# Patient Record
Sex: MALE | Race: WHITE | NOT HISPANIC OR LATINO | Employment: OTHER | URBAN - METROPOLITAN AREA
[De-identification: names, ages, dates, MRNs, and addresses within clinical notes are randomized per-mention and may not be internally consistent; named-entity substitution may affect disease eponyms.]

---

## 2017-02-01 ENCOUNTER — TRANSCRIBE ORDERS (OUTPATIENT)
Dept: PULMONOLOGY | Facility: HOSPITAL | Age: 72
End: 2017-02-01

## 2017-02-01 ENCOUNTER — HOSPITAL ENCOUNTER (OUTPATIENT)
Dept: PULMONOLOGY | Facility: HOSPITAL | Age: 72
Discharge: HOME | End: 2017-02-01
Attending: INTERNAL MEDICINE

## 2017-02-01 DIAGNOSIS — R06.02 SHORTNESS OF BREATH: Primary | ICD-10-CM

## 2017-02-03 ENCOUNTER — HOSPITAL ENCOUNTER (OUTPATIENT)
Dept: PULMONOLOGY | Facility: HOSPITAL | Age: 72
Discharge: HOME | End: 2017-02-03
Attending: INTERNAL MEDICINE | Admitting: INTERNAL MEDICINE

## 2017-02-03 VITALS — OXYGEN SATURATION: 98 % | RESPIRATION RATE: 16 BRPM | HEART RATE: 97 BPM

## 2017-02-03 PROCEDURE — 63710000001 ALBUTEROL PER 1 MG: Performed by: INTERNAL MEDICINE

## 2017-02-03 PROCEDURE — 94729 DIFFUSING CAPACITY: CPT

## 2017-02-03 PROCEDURE — A9270 NON-COVERED ITEM OR SERVICE: HCPCS | Performed by: INTERNAL MEDICINE

## 2017-02-03 PROCEDURE — 94726 PLETHYSMOGRAPHY LUNG VOLUMES: CPT

## 2017-02-03 PROCEDURE — 94060 EVALUATION OF WHEEZING: CPT

## 2017-02-03 RX ORDER — ALBUTEROL SULFATE 2.5 MG/3ML
2.5 SOLUTION RESPIRATORY (INHALATION) ONCE
Status: COMPLETED | OUTPATIENT
Start: 2017-02-03 | End: 2017-02-03

## 2017-02-03 RX ADMIN — ALBUTEROL SULFATE 2.5 MG: 2.5 SOLUTION RESPIRATORY (INHALATION) at 10:36

## 2017-02-20 ENCOUNTER — TELEPHONE (OUTPATIENT)
Dept: INTERNAL MEDICINE | Facility: CLINIC | Age: 72
End: 2017-02-20

## 2017-02-20 RX ORDER — HYDROCORTISONE ACETATE 25 MG/1
25 SUPPOSITORY RECTAL 2 TIMES DAILY
Qty: 14 SUPPOSITORY | Refills: 0 | Status: SHIPPED | OUTPATIENT
Start: 2017-02-20 | End: 2017-02-27

## 2017-02-20 NOTE — TELEPHONE ENCOUNTER
Patient advised as per below and voiced understanding.    ---- Message from Gladis Longoria MD sent at 2/20/2017 12:11 PM EST -----  Regarding: RE: REQUESTING TO BE SEEN.  I escribed Anusol HC suppositories to his pharmacy.  He also needs to get on Colace stool softeners.  If not improving in a couple of days, needs to be seen.  Thanks.  ----- Message -----     From: Ivanna Hagen MA     Sent: 2/20/2017  10:38 AM       To: Gladis Longoria MD  Subject: FW: REQUESTING TO BE SEEN.                           ----- Message -----     From: Silke Zavala     Sent: 2/20/2017   9:54 AM       To: Yas Patel[address] [place] [address]  Subject: REQUESTING TO BE SEEN.                           BALBIR    Patient having severe pain with hemmhroids  Off and on x 3-4 weeks and last few days has become excruciating, especially when having a bowel movement.    Can Dr. Longoria work him in today or send something to pharmacy     [phone]    [facility]

## 2017-02-22 ENCOUNTER — OFFICE VISIT (OUTPATIENT)
Dept: INTERNAL MEDICINE | Facility: CLINIC | Age: 72
End: 2017-02-22

## 2017-02-22 VITALS
BODY MASS INDEX: 48.28 KG/M2 | SYSTOLIC BLOOD PRESSURE: 128 MMHG | HEART RATE: 122 BPM | HEIGHT: 65 IN | WEIGHT: 289.8 LBS | OXYGEN SATURATION: 96 % | TEMPERATURE: 98.1 F | DIASTOLIC BLOOD PRESSURE: 72 MMHG

## 2017-02-22 DIAGNOSIS — K62.5 RECTAL BLEEDING: ICD-10-CM

## 2017-02-22 DIAGNOSIS — F32.A DEPRESSION, UNSPECIFIED DEPRESSION TYPE: ICD-10-CM

## 2017-02-22 DIAGNOSIS — K62.89 RECTAL PAIN: Primary | ICD-10-CM

## 2017-02-22 PROCEDURE — 99213 OFFICE O/P EST LOW 20 MIN: CPT | Performed by: FAMILY MEDICINE

## 2017-02-22 RX ORDER — BUPROPION HYDROCHLORIDE 300 MG/1
300 TABLET ORAL DAILY
Qty: 30 TABLET | Refills: 5 | Status: SHIPPED | OUTPATIENT
Start: 2017-02-22 | End: 2017-05-17 | Stop reason: SDUPTHER

## 2017-02-22 NOTE — PROGRESS NOTES
Subjective     Kimberly Wei is a 71 y.o. male, who presents with a chief complaint of   Chief Complaint   Patient presents with   • Follow-up   • Hemorrhoids       HPI     1. Pt c/o rectal pain X 3 weeks.  He describes it a severe burning.  Associated with rectal bleeding/bright red blood in the toilet. 10 days ago it worsened and bowel movements have been extremely painful.  He has been taking stool softeners for 3-4 days.  He has tried preparation H, which he tried to apply internally and it was very painful to apply.    2. Depression.  He has been taking bupropion  mg for about 2 months.  He hasn't noticed much improvement and would like to increase the dose.    The following portions of the patient's history were reviewed and updated as appropriate: allergies, current medications, past family history, past medical history, past social history, past surgical history and problem list.    Allergies: Review of patient's allergies indicates no known allergies.    Review of Systems    Objective     Wt Readings from Last 3 Encounters:   02/22/17 289 lb 12.8 oz (131 kg)   10/04/16 287 lb (130 kg)   09/26/16 287 lb (130 kg)     Temp Readings from Last 3 Encounters:   02/22/17 98.1 °F (36.7 °C)     BP Readings from Last 3 Encounters:   02/22/17 128/72   10/04/16 155/88   09/26/16 130/90     Pulse Readings from Last 3 Encounters:   02/22/17 (!) 122   02/03/17 97   10/04/16 115     Body mass index is 48.23 kg/(m^2).  SpO2 Readings from Last 3 Encounters:   02/22/17 96%   02/03/17 98%   10/04/16 94%       Physical Exam   Constitutional: He appears well-developed and well-nourished. He appears distressed (mildly distressed).   HENT:   Head: Normocephalic.   Eyes: Conjunctivae are normal.   Genitourinary: Rectal exam shows external hemorrhoid (not thrombosed). Internal hemorrhoid: not done.   Neurological: He is alert.   Skin: Skin is warm and dry.   Psychiatric: He has a normal mood and affect. His behavior is normal.    Nursing note and vitals reviewed.        Assessment/Plan   Kimberly was seen today for follow-up and hemorrhoids.    Diagnoses and all orders for this visit:    Rectal pain  -     Ambulatory Referral to General Surgery    Depression, unspecified depression type  -     buPROPion XL (WELLBUTRIN XL) 300 MG 24 hr tablet; Take 1 tablet by mouth Daily.    Rectal bleeding  -     Ambulatory Referral to General Surgery    1. Rectal pain and bleeding.  ?Anal fissure.  Continue stool softeners.  Referral to general surgery.    2. Depression.  Minimal improvement.  Increased bupropion XL to 300 mg daily.      Outpatient Medications Prior to Visit   Medication Sig Dispense Refill   • hydrocortisone (ANUSOL-HC) 25 MG suppository Insert 1 suppository into the rectum 2 (Two) Times a Day for 7 days. 14 suppository 0   • lisinopril (PRINIVIL,ZESTRIL) 20 MG tablet Take 1 tablet by mouth daily. 90 tablet 3   • buPROPion XL (WELLBUTRIN XL) 150 MG 24 hr tablet Take 1 tablet by mouth daily. 30 tablet 5     No facility-administered medications prior to visit.      New Medications Ordered This Visit   Medications   • buPROPion XL (WELLBUTRIN XL) 300 MG 24 hr tablet     Sig: Take 1 tablet by mouth Daily.     Dispense:  30 tablet     Refill:  5     [unfilled]  Medications Discontinued During This Encounter   Medication Reason   • buPROPion XL (WELLBUTRIN XL) 150 MG 24 hr tablet Reorder         Return in about 2 months (around 4/22/2017).

## 2017-02-23 ENCOUNTER — OFFICE VISIT (OUTPATIENT)
Dept: SURGERY | Facility: CLINIC | Age: 72
End: 2017-02-23

## 2017-02-23 VITALS
HEIGHT: 65 IN | WEIGHT: 292.8 LBS | DIASTOLIC BLOOD PRESSURE: 72 MMHG | SYSTOLIC BLOOD PRESSURE: 128 MMHG | BODY MASS INDEX: 48.78 KG/M2

## 2017-02-23 DIAGNOSIS — K64.4 EXTERNAL HEMORRHOIDS: ICD-10-CM

## 2017-02-23 DIAGNOSIS — K64.8 INTERNAL HEMORRHOIDS: ICD-10-CM

## 2017-02-23 DIAGNOSIS — K62.89 PROCTALGIA: ICD-10-CM

## 2017-02-23 DIAGNOSIS — K62.5 RECTAL BLEED: Primary | ICD-10-CM

## 2017-02-23 PROCEDURE — 99203 OFFICE O/P NEW LOW 30 MIN: CPT | Performed by: SURGERY

## 2017-02-23 NOTE — PROGRESS NOTES
PATIENT INFORMATION  Kimberly Wei   - 1945    CHIEF COMPLAINT  Chief Complaint   Patient presents with   • Rectal Bleeding   • Rectal Pain    Referral from Dr. Langford    HISTORY OF PRESENT ILLNESS  HPI  Patient is a 71-year-old male referred by Dr. Langford for a 3 week history of rectal pain.  Patient reports he has had rectal pain and itching for the last 3 weeks. He describes it a severe burning. Associated with rectal bleeding/bright red blood in the toilet. 10 days ago it worsened and bowel movements have been extremely painful. He has been taking stool softeners for 3-4 days. He has tried preparation H, which he tried to apply internally and it was very painful to apply.  He was seen by his primary care physician yesterday.  He was prescribed Anusol HC suppositories.  With the patient's wife reports she was unable to insert as he was in severe pain.  He does report constipation lately.  Denies any diarrhea.  He has never had a colonoscopy before.  He did the COLOGARD Test 18 months ago and according to the patient it was negative.       REVIEW OF SYSTEMS  Review of Systems   Constitutional: Positive for activity change.   Respiratory: Negative.    Cardiovascular: Negative.    Gastrointestinal: Positive for blood in stool, constipation and rectal pain.   Genitourinary: Negative.    Musculoskeletal: Negative.    Skin: Negative.    Neurological: Negative.    Hematological: Negative.    Psychiatric/Behavioral:        Depression         ACTIVE PROBLEMS  Patient Active Problem List    Diagnosis   • Rectal pain [K62.89]   • Depression [F32.9]   • Fatigue [R53.83]   • Hypertension [I10]   • Routine health maintenance [Z00.00]         PAST MEDICAL HISTORY  Past Medical History   Diagnosis Date   • Hypertension          SURGICAL HISTORY  Past Surgical History   Procedure Laterality Date   • Cholecystectomy  1990s         FAMILY HISTORY  Family History   Problem Relation Age of Onset   • Diabetes  "Father    • Stomach cancer Father    • Alcohol abuse Father    • Throat cancer Brother    • Alcohol abuse Brother          SOCIAL HISTORY  Social History     Occupational History   • Not on file.     Social History Main Topics   • Smoking status: Former Smoker   • Smokeless tobacco: Never Used   • Alcohol use No   • Drug use: No   • Sexual activity: Defer         CURRENT MEDICATIONS    Current Outpatient Prescriptions:   •  buPROPion XL (WELLBUTRIN XL) 300 MG 24 hr tablet, Take 1 tablet by mouth Daily., Disp: 30 tablet, Rfl: 5  •  hydrocortisone (ANUSOL-HC) 25 MG suppository, Insert 1 suppository into the rectum 2 (Two) Times a Day for 7 days., Disp: 14 suppository, Rfl: 0  •  hydrocortisone (PROCTO-HEMANT) 1 % cream rectal cream, Insert  into the rectum 3 (Three) Times a Day., Disp: 30 g, Rfl: 0  •  lisinopril (PRINIVIL,ZESTRIL) 20 MG tablet, Take 1 tablet by mouth daily., Disp: 90 tablet, Rfl: 3  •  VENTOLIN  (90 BASE) MCG/ACT inhaler, As Needed., Disp: , Rfl:     ALLERGIES  Review of patient's allergies indicates no known allergies.    VITALS  Vitals:    02/23/17 1454   BP: 128/72   Weight: 292 lb 12.8 oz (133 kg)   Height: 65\" (165.1 cm)       LAST RESULTS   Hospital Outpatient Visit on 10/04/2016   Component Date Value Ref Range Status   • BH CV STRESS PROTOCOL 1 10/04/2016 Pankaj   Final   • Stage 1 10/04/2016 1   Final   • Duration Min Stage 1 10/04/2016 3   Final   • Duration Sec Stage 1 10/04/2016 0   Final   • Grade Stage 1 10/04/2016 10   Final   • Speed Stage 1 10/04/2016 1.7   Final   • BH CV STRESS METS STAGE 1 10/04/2016 5   Final   • Baseline HR 10/04/2016 121  bpm Final   • Baseline BP 10/04/2016 155/88  mmHg Final   • Target HR (85%) 10/04/2016 126  bpm Final   • HR Stage 1 10/04/2016 149   Final   • BP Stage 1 10/04/2016 220/95   Final   • O2 Stage 1 10/04/2016 95   Final   • Peak HR 10/04/2016 152  bpm Final   • Percent Target HR 10/04/2016 121  % Final   • Peak BP 10/04/2016 220/95  mmHg " Final   • Recovery HR 10/04/2016 113  bpm Final   • Recovery BP 10/04/2016 137/79  mmHg Final   • Exercise duration (min) 10/04/2016 2  min Final   • Exercise duration (sec) 10/04/2016 37  sec Final   • Estimated workload 10/04/2016 4.6  METS Final     No results found.    PHYSICAL EXAM  Physical Exam   Constitutional: He is oriented to person, place, and time. He appears well-developed and well-nourished.   HENT:   Head: Normocephalic and atraumatic.   Eyes: No scleral icterus.   Neck: Normal range of motion. Neck supple.   Cardiovascular: Normal rate, regular rhythm and normal heart sounds.    Pulmonary/Chest: Breath sounds normal.   Abdominal:   Soft, morbidly obese, nondistended, reducible umbilical hernia.   Genitourinary:   Genitourinary Comments: Digital rectal exam done in the presence of the chaperone  Large internal and external hemorrhoids.  External hemorrhoids are swollen and inflamed.  No thromboses noted.  No gross blood.  Examination was very painful and patient did not allow me to fully evaluate question anal fissure     Musculoskeletal: He exhibits edema.   Lymphadenopathy:     He has no cervical adenopathy.   Neurological: He is alert and oriented to person, place, and time.   Nursing note and vitals reviewed.      ASSESSMENT    Proctalgia  Rectal bleed  External and internal hemorrhoids    Question anal fissure    Pros and cons risks and benefits discussed with the patient including referral to colorectal surgery.  At this time there would like to treat medically.  And hold off on colorectal surgery referral.    Anusol HC cream was e- scribed.  Patient already has Anusol HC suppositories.  He was advised to take stool softeners and MiraLAX or Metamucil i.e. fiber as needed to avoid constipation.  High-fiber instruction sheet provided.    Sitz baths.  Instructions provided to the patient.    PLAN  Follow-up one week.  Patient was advised to call the office sooner should he have worsening symptoms  or go to the nearest emergency room.  Once inflammation is resolved will need endoscopic visualization-colonoscopy  --discussed with patient.

## 2017-02-27 ENCOUNTER — TELEPHONE (OUTPATIENT)
Dept: SURGERY | Facility: CLINIC | Age: 72
End: 2017-02-27

## 2017-02-27 NOTE — TELEPHONE ENCOUNTER
Pt called the answering service this weekend wanting to cx his appt. I called and spoke with him to see if he wanted to r/s. He said he would call back to r/s.

## 2017-03-01 DIAGNOSIS — K62.89 RECTAL PAIN: Primary | ICD-10-CM

## 2017-03-01 DIAGNOSIS — K62.5 RECTAL BLEEDING: ICD-10-CM

## 2017-03-10 ENCOUNTER — OFFICE VISIT (OUTPATIENT)
Dept: SURGERY | Facility: CLINIC | Age: 72
End: 2017-03-10

## 2017-03-10 VITALS
DIASTOLIC BLOOD PRESSURE: 94 MMHG | SYSTOLIC BLOOD PRESSURE: 152 MMHG | OXYGEN SATURATION: 98 % | HEIGHT: 66 IN | HEART RATE: 101 BPM | TEMPERATURE: 98 F | WEIGHT: 287.3 LBS | RESPIRATION RATE: 20 BRPM | BODY MASS INDEX: 46.17 KG/M2

## 2017-03-10 DIAGNOSIS — K60.2 FISSURE, ANAL: Primary | ICD-10-CM

## 2017-03-10 PROCEDURE — 99204 OFFICE O/P NEW MOD 45 MIN: CPT | Performed by: COLON & RECTAL SURGERY

## 2017-03-10 NOTE — PROGRESS NOTES
Kimberly Wei is a 71 y.o. male who is seen as a consult at the request of Gladis Longoria MD for Rectal Bleeding and Rectal Pain.      HPI:    Pt states 3-4 weeks ago, began experiencing excruciating pain /burning with BMs    Also BRBPR: very small amount    Pain better with sitting still    Pain and bleeding has been better for the past 3 days    Has been using Tucks pads: uncertain if helped    Supp too painful to insert    HC cream helpful    Pt states he has had hemorrhoid issues in the past    No hemorrhoidectomy/other anorectal sx    Usually has 1-2 BMs per day  Recently has had BM qod    Stools formed    Has been using SS for the past 3-4 weeks: helped somewhat    Just recently started fiber supplement: not taking daily.  Stopped when he started feeling better    Has never had cy    States Cologard about 1 year ago: negative    SxHx: lap mason     FamHx: father  gastric cancer age 42.  No known hx colon polyps colon cancer, IBD    Past Medical History   Diagnosis Date   • Chronic fatigue    • Depression    • Hemorrhoids      INT/EXT   • Hypertension    • Proctalgia    • Snoring    • SOB (shortness of breath)        Past Surgical History   Procedure Laterality Date   • Cholecystectomy N/A        Social History:   reports that he has quit smoking. He has never used smokeless tobacco. He reports that he drinks alcohol. He reports that he does not use illicit drugs.      Marriage status:     Family History   Problem Relation Age of Onset   • Diabetes Father    • Stomach cancer Father    • Alcohol abuse Father    • Throat cancer Brother    • Alcohol abuse Brother          Current Outpatient Prescriptions:   •  BREO ELLIPTA 200-25 MCG/INH aerosol powder , , Disp: , Rfl:   •  buPROPion XL (WELLBUTRIN XL) 300 MG 24 hr tablet, Take 1 tablet by mouth Daily., Disp: 30 tablet, Rfl: 5  •  hydrocortisone (PROCTO-HEMANT) 1 % cream rectal cream, Insert  into the rectum 3 (Three) Times a Day., Disp: 30  g, Rfl: 0  •  lisinopril (PRINIVIL,ZESTRIL) 20 MG tablet, Take 1 tablet by mouth daily., Disp: 90 tablet, Rfl: 3  •  VENTOLIN  (90 BASE) MCG/ACT inhaler, As Needed., Disp: , Rfl:     Allergy  Review of patient's allergies indicates no known allergies.    Review of Systems   Constitution: Negative for decreased appetite, weakness and weight gain.   HENT: Negative for congestion, headaches, hearing loss and hoarse voice.    Eyes: Negative for blurred vision, discharge and visual disturbance.   Cardiovascular: Negative for chest pain, cyanosis and leg swelling.   Respiratory: Positive for cough, shortness of breath and snoring. Negative for sleep disturbances due to breathing.    Endocrine: Negative for cold intolerance and heat intolerance.   Hematologic/Lymphatic: Does not bruise/bleed easily.   Skin: Positive for itching. Negative for poor wound healing and skin cancer.   Musculoskeletal: Negative for arthritis, back pain, joint pain and joint swelling.   Gastrointestinal: Negative for abdominal pain, change in bowel habit, bowel incontinence and constipation.   Genitourinary: Negative for bladder incontinence, dysuria and hematuria.   Neurological: Positive for excessive daytime sleepiness. Negative for brief paralysis, dizziness, focal weakness and light-headedness.   Psychiatric/Behavioral: Negative for altered mental status and hallucinations. The patient does not have insomnia.    Allergic/Immunologic: Negative for HIV exposure and persistent infections.   All other systems reviewed and are negative.      Vitals:    03/10/17 1443   BP: 152/94   Pulse: 101   Resp: 20   Temp: 98 °F (36.7 °C)   SpO2: 98%     Body mass index is 46.37 kg/(m^2).    Physical Exam   Constitutional: He is oriented to person, place, and time. He appears well-developed and well-nourished. No distress.   obese   HENT:   Head: Normocephalic and atraumatic.   Nose: Nose normal.   Mouth/Throat: Oropharynx is clear and moist.   Eyes:  Conjunctivae and EOM are normal. Pupils are equal, round, and reactive to light.   Neck: Normal range of motion. No tracheal deviation present.   Pulmonary/Chest: Effort normal and breath sounds normal. No respiratory distress.   Abdominal: Soft. Bowel sounds are normal. He exhibits no distension.   Genitourinary:   Genitourinary Comments: Perianal exam:  - anterior   tags       Musculoskeletal: Normal range of motion. He exhibits no edema or deformity.   Neurological: He is alert and oriented to person, place, and time. No cranial nerve deficit. Coordination and gait normal.   Skin: Skin is warm and dry.   Psychiatric: He has a normal mood and affect. His behavior is normal. Judgment normal.       Review of Medical Record:  I reviewed notes from pcp from 2/22/17 and Dr. Sevilla from 2/23/17    Assessment:  1. Fissure, anal        Plan:  I discussed with patient options on treating fissure: lateral internal anal sphincterotomy, chemical sphincterotomy with Botox, or topical creams of nitroglycerin, diltiazem, or nifedipine.  I discussed risks and benefits of all.  Risks of the lateral internal anal sphincterotomy are small chance of fecal incontinence, slow wound healing, and it is an invasive procedure versus the other 2 options, but the benefit is 97% success in fixing a fissure.  Chemical sphincterotomy has the benefit of no permanent fecal incontinence but a 80-85% success rate.  The topical creams have no incontinence risk, but are slow to heal the fissure and are only 80% successful.  I wrote for diltiazem and lidocaine cream to be placed on the anus 3 times a day.  I recommended taking MiraLAX and fiber daily.  I gave out written instructions.   Patient to follow-up in 5 weeks.    Scribed for Gema Ritter MD by Lauryn Malik PA-C 3/10/2017        EMR Dragon/Transcription disclaimer:   Much of this encounter note is an electronic transcription/translation of spoken language to printed text. The electronic  translation of spoken language may permit erroneous, or at times, nonsensical words or phrases to be inadvertently transcribed; Although I have reviewed the note for such errors, some may still exist.

## 2017-03-18 DIAGNOSIS — I10 ESSENTIAL HYPERTENSION: ICD-10-CM

## 2017-03-20 RX ORDER — LISINOPRIL 20 MG/1
TABLET ORAL
Qty: 90 TABLET | Refills: 3 | Status: SHIPPED | OUTPATIENT
Start: 2017-03-20 | End: 2018-06-05 | Stop reason: SDUPTHER

## 2017-04-21 ENCOUNTER — OFFICE VISIT (OUTPATIENT)
Dept: SURGERY | Facility: CLINIC | Age: 72
End: 2017-04-21

## 2017-04-21 VITALS
WEIGHT: 289.6 LBS | DIASTOLIC BLOOD PRESSURE: 96 MMHG | TEMPERATURE: 97.8 F | HEART RATE: 90 BPM | BODY MASS INDEX: 46.74 KG/M2 | OXYGEN SATURATION: 96 % | SYSTOLIC BLOOD PRESSURE: 140 MMHG

## 2017-04-21 DIAGNOSIS — K60.2 FISSURE, ANAL: Primary | ICD-10-CM

## 2017-04-21 DIAGNOSIS — Z12.11 ENCOUNTER FOR SCREENING COLONOSCOPY: ICD-10-CM

## 2017-04-21 PROCEDURE — 99213 OFFICE O/P EST LOW 20 MIN: CPT | Performed by: COLON & RECTAL SURGERY

## 2017-04-21 RX ORDER — SODIUM CHLORIDE, SODIUM LACTATE, POTASSIUM CHLORIDE, CALCIUM CHLORIDE 600; 310; 30; 20 MG/100ML; MG/100ML; MG/100ML; MG/100ML
30 INJECTION, SOLUTION INTRAVENOUS CONTINUOUS
Status: CANCELLED | OUTPATIENT
Start: 2017-04-21

## 2017-04-21 RX ORDER — SODIUM CHLORIDE 0.9 % (FLUSH) 0.9 %
1-10 SYRINGE (ML) INJECTION AS NEEDED
Status: CANCELLED | OUTPATIENT
Start: 2017-04-21

## 2017-04-21 NOTE — PROGRESS NOTES
Kimberly Wei is a 71 y.o. male in for follow up of anal fissure      Patient states that he is still having intermittent pain    Had lots of pain with BM Tuesday    Could not use dilt/lido cream; was not able to reach around to his bottom    Did use bid for the first couple of days after last visit    Still having small amt of blood with most BMs    Taking 2 fiber pills qd    Taking stool softeners qd    Having 1 BM qd    Stools are formed  Past Medical History:   Diagnosis Date   • Chronic fatigue    • Depression    • Hemorrhoids     INT/EXT   • Hypertension    • Proctalgia    • Snoring    • SOB (shortness of breath)      Past Surgical History:   Procedure Laterality Date   • CHOLECYSTECTOMY N/A 1990s         /96 (BP Location: Left arm, Patient Position: Sitting, Cuff Size: Adult)  Pulse 90  Temp 97.8 °F (36.6 °C) (Oral)   Wt 289 lb 9.6 oz (131 kg)  SpO2 96%  BMI 46.74 kg/m2  Body mass index is 46.74 kg/(m^2).      PE:  Physical Exam   Constitutional: He appears well-developed. No distress.   HENT:   Head: Normocephalic and atraumatic.   Abdominal: Soft. He exhibits no distension.   Genitourinary:   Genitourinary Comments: Perianal exam: external: anterior fissure   Musculoskeletal: Normal range of motion.   Neurological: He is alert.   Psychiatric: Thought content normal.         Assessment:   1. Fissure, anal    2. Encounter for screening colonoscopy         Plan:     I discussed with patient options on treating fissure: lateral internal anal sphincterotomy, chemical sphincterotomy with Botox, or topical creams of nitroglycerin, diltiazem, or nifedipine.  I discussed risks and benefits of all.  Risks of the lateral internal anal sphincterotomy are small chance of fecal incontinence, slow wound healing, and it is an invasive procedure versus the other 2 options, but the benefit is 97% success in fixing a fissure.  Chemical sphincterotomy has the benefit of no permanent fecal incontinence but a 80-85%  success rate.  The topical creams have no incontinence risk, but are slow to heal the fissure and are only 80% successful.    Pt wishes to proceed with colonoscopy and chemical sphincterotomy with botox    Scribed for Gema Ritter MD by Lauryn Malik PA-C 4/21/2017  This patient was evaluated by me, recommendations made, documentation reviewed, edited, and revised by me, Gema Ritter MD

## 2017-05-17 DIAGNOSIS — F32.A DEPRESSION, UNSPECIFIED DEPRESSION TYPE: ICD-10-CM

## 2017-05-17 RX ORDER — BUPROPION HYDROCHLORIDE 300 MG/1
300 TABLET ORAL DAILY
Qty: 90 TABLET | Refills: 3 | Status: SHIPPED | OUTPATIENT
Start: 2017-05-17 | End: 2017-05-19 | Stop reason: SDUPTHER

## 2017-05-18 ENCOUNTER — ANESTHESIA (OUTPATIENT)
Dept: GASTROENTEROLOGY | Facility: HOSPITAL | Age: 72
End: 2017-05-18

## 2017-05-18 ENCOUNTER — ANESTHESIA EVENT (OUTPATIENT)
Dept: GASTROENTEROLOGY | Facility: HOSPITAL | Age: 72
End: 2017-05-18

## 2017-05-18 ENCOUNTER — HOSPITAL ENCOUNTER (OUTPATIENT)
Facility: HOSPITAL | Age: 72
Setting detail: HOSPITAL OUTPATIENT SURGERY
Discharge: HOME | End: 2017-05-18
Attending: COLON & RECTAL SURGERY | Admitting: COLON & RECTAL SURGERY

## 2017-05-18 VITALS
SYSTOLIC BLOOD PRESSURE: 127 MMHG | WEIGHT: 283.38 LBS | TEMPERATURE: 97.8 F | OXYGEN SATURATION: 96 % | HEIGHT: 65 IN | HEART RATE: 77 BPM | DIASTOLIC BLOOD PRESSURE: 80 MMHG | RESPIRATION RATE: 15 BRPM | BODY MASS INDEX: 47.21 KG/M2

## 2017-05-18 DIAGNOSIS — Z12.11 ENCOUNTER FOR SCREENING COLONOSCOPY: ICD-10-CM

## 2017-05-18 PROCEDURE — 25010000002 PROPOFOL 10 MG/ML EMULSION: Performed by: ANESTHESIOLOGY

## 2017-05-18 PROCEDURE — 45385 COLONOSCOPY W/LESION REMOVAL: CPT | Performed by: COLON & RECTAL SURGERY

## 2017-05-18 PROCEDURE — 45380 COLONOSCOPY AND BIOPSY: CPT | Performed by: COLON & RECTAL SURGERY

## 2017-05-18 PROCEDURE — 88305 TISSUE EXAM BY PATHOLOGIST: CPT | Performed by: COLON & RECTAL SURGERY

## 2017-05-18 PROCEDURE — 46505 CHEMODENERVATION ANAL MUSC: CPT | Performed by: COLON & RECTAL SURGERY

## 2017-05-18 PROCEDURE — 25010000002 ONABOTULINUMTOXINA 100 UNITS RECONSTITUTED SOLUTION: Performed by: COLON & RECTAL SURGERY

## 2017-05-18 RX ORDER — PROPOFOL 10 MG/ML
VIAL (ML) INTRAVENOUS CONTINUOUS PRN
Status: DISCONTINUED | OUTPATIENT
Start: 2017-05-18 | End: 2017-05-18 | Stop reason: SURG

## 2017-05-18 RX ORDER — SODIUM CHLORIDE 0.9 % (FLUSH) 0.9 %
1-10 SYRINGE (ML) INJECTION AS NEEDED
Status: DISCONTINUED | OUTPATIENT
Start: 2017-05-18 | End: 2017-05-18 | Stop reason: HOSPADM

## 2017-05-18 RX ORDER — PROPOFOL 10 MG/ML
VIAL (ML) INTRAVENOUS AS NEEDED
Status: DISCONTINUED | OUTPATIENT
Start: 2017-05-18 | End: 2017-05-18 | Stop reason: SURG

## 2017-05-18 RX ORDER — LIDOCAINE HYDROCHLORIDE 20 MG/ML
INJECTION, SOLUTION INFILTRATION; PERINEURAL AS NEEDED
Status: DISCONTINUED | OUTPATIENT
Start: 2017-05-18 | End: 2017-05-18 | Stop reason: SURG

## 2017-05-18 RX ORDER — BUPIVACAINE HYDROCHLORIDE AND EPINEPHRINE 5; 5 MG/ML; UG/ML
INJECTION, SOLUTION EPIDURAL; INTRACAUDAL; PERINEURAL AS NEEDED
Status: DISCONTINUED | OUTPATIENT
Start: 2017-05-18 | End: 2017-05-18 | Stop reason: HOSPADM

## 2017-05-18 RX ORDER — SODIUM CHLORIDE, SODIUM LACTATE, POTASSIUM CHLORIDE, CALCIUM CHLORIDE 600; 310; 30; 20 MG/100ML; MG/100ML; MG/100ML; MG/100ML
30 INJECTION, SOLUTION INTRAVENOUS CONTINUOUS
Status: DISCONTINUED | OUTPATIENT
Start: 2017-05-18 | End: 2017-05-18 | Stop reason: HOSPADM

## 2017-05-18 RX ADMIN — SODIUM CHLORIDE, POTASSIUM CHLORIDE, SODIUM LACTATE AND CALCIUM CHLORIDE 30 ML/HR: 600; 310; 30; 20 INJECTION, SOLUTION INTRAVENOUS at 13:51

## 2017-05-18 RX ADMIN — LIDOCAINE HYDROCHLORIDE 50 MG: 20 INJECTION, SOLUTION INFILTRATION; PERINEURAL at 14:41

## 2017-05-18 RX ADMIN — PROPOFOL 150 MG: 10 INJECTION, EMULSION INTRAVENOUS at 14:41

## 2017-05-18 RX ADMIN — PROPOFOL 140 MCG/KG/MIN: 10 INJECTION, EMULSION INTRAVENOUS at 14:41

## 2017-05-19 DIAGNOSIS — F32.A DEPRESSION, UNSPECIFIED DEPRESSION TYPE: ICD-10-CM

## 2017-05-19 LAB
CYTO UR: NORMAL
LAB AP CASE REPORT: NORMAL
Lab: NORMAL
PATH REPORT.FINAL DX SPEC: NORMAL
PATH REPORT.GROSS SPEC: NORMAL

## 2017-05-19 RX ORDER — BUPROPION HYDROCHLORIDE 300 MG/1
300 TABLET ORAL DAILY
Qty: 90 TABLET | Refills: 3 | Status: SHIPPED | OUTPATIENT
Start: 2017-05-19 | End: 2017-05-31 | Stop reason: SDUPTHER

## 2017-05-31 DIAGNOSIS — F32.A DEPRESSION, UNSPECIFIED DEPRESSION TYPE: ICD-10-CM

## 2017-05-31 RX ORDER — BUPROPION HYDROCHLORIDE 300 MG/1
300 TABLET ORAL DAILY
Qty: 90 TABLET | Refills: 3 | Status: SHIPPED | OUTPATIENT
Start: 2017-05-31 | End: 2019-01-28

## 2017-08-09 ENCOUNTER — TRANSCRIBE ORDERS (OUTPATIENT)
Dept: ADMINISTRATIVE | Facility: HOSPITAL | Age: 72
End: 2017-08-09

## 2017-08-09 ENCOUNTER — HOSPITAL ENCOUNTER (OUTPATIENT)
Dept: CT IMAGING | Facility: HOSPITAL | Age: 72
Discharge: HOME | End: 2017-08-09
Attending: INTERNAL MEDICINE | Admitting: INTERNAL MEDICINE

## 2017-08-09 DIAGNOSIS — R06.02 SHORTNESS OF BREATH: Primary | ICD-10-CM

## 2017-08-09 DIAGNOSIS — R06.02 SHORTNESS OF BREATH: ICD-10-CM

## 2017-08-09 PROCEDURE — 0 IOPAMIDOL PER 1 ML: Performed by: INTERNAL MEDICINE

## 2017-08-09 PROCEDURE — 71275 CT ANGIOGRAPHY CHEST: CPT

## 2017-08-09 RX ADMIN — IOPAMIDOL 100 ML: 755 INJECTION, SOLUTION INTRAVENOUS at 10:04

## 2018-06-05 DIAGNOSIS — I10 ESSENTIAL HYPERTENSION: ICD-10-CM

## 2018-06-05 RX ORDER — LISINOPRIL 20 MG/1
20 TABLET ORAL DAILY
Qty: 14 TABLET | Refills: 0 | Status: SHIPPED | OUTPATIENT
Start: 2018-06-05 | End: 2018-08-14 | Stop reason: SDUPTHER

## 2018-06-05 RX ORDER — LISINOPRIL 20 MG/1
20 TABLET ORAL DAILY
Qty: 90 TABLET | Refills: 3 | Status: SHIPPED | OUTPATIENT
Start: 2018-06-05 | End: 2018-09-18 | Stop reason: SDUPTHER

## 2018-06-14 ENCOUNTER — TELEPHONE (OUTPATIENT)
Dept: SURGERY | Facility: CLINIC | Age: 73
End: 2018-06-14

## 2018-06-14 NOTE — TELEPHONE ENCOUNTER
Pt was scheduled for 3:20 tomorrow, unable to reschedule tomorrow because leaving town on Monday and has other appts in the morning. Pt says he is having the same pain as last time when diagnosed with a fissure. Wants to know if we can call in cream to [facility] and pt will schedule appt for when he returns from vacation.

## 2018-06-15 ENCOUNTER — OFFICE VISIT (OUTPATIENT)
Dept: SURGERY | Facility: CLINIC | Age: 73
End: 2018-06-15

## 2018-06-15 VITALS
RESPIRATION RATE: 20 BRPM | HEIGHT: 65 IN | OXYGEN SATURATION: 96 % | HEART RATE: 94 BPM | SYSTOLIC BLOOD PRESSURE: 146 MMHG | WEIGHT: 289.8 LBS | TEMPERATURE: 98.1 F | DIASTOLIC BLOOD PRESSURE: 84 MMHG | BODY MASS INDEX: 48.28 KG/M2

## 2018-06-15 DIAGNOSIS — K60.2 FISSURE, ANAL: Primary | ICD-10-CM

## 2018-06-15 PROCEDURE — 99213 OFFICE O/P EST LOW 20 MIN: CPT | Performed by: COLON & RECTAL SURGERY

## 2018-06-15 NOTE — PROGRESS NOTES
"Kimberly Wei is a 72 y.o. male in for follow up of Fissure, anal    Pt states he began to have recurrent fissure symptoms for the past 2-3 months  When he has BM, he has tearing pain  Pain is worst during BM: \"like a hot iron\"  After evacuation, pain calms down  Sometimes after BM he still has discomfort: for 30-60 minutes after    He notes small amount blood with most of his BMs    He is not using dilt/lido cream: he has difficulty reaching his anus    He took fiber in the past, but is not taking currently  He took 1 dose senna earlier this week    He usually has a BM every 1.5 days  If he is not taking senna, stools tend to run loose    He is s/p colonoscopy with botox May 2017: + polyps, fissure    /84 (BP Location: Left arm, Patient Position: Sitting, Cuff Size: Adult)   Pulse 94   Temp 98.1 °F (36.7 °C)   Resp 20   Ht 165.1 cm (65\")   Wt 131 kg (289 lb 12.8 oz)   SpO2 96%   BMI 48.23 kg/m²   Body mass index is 48.23 kg/m².      PE:  Physical Exam   Constitutional: He appears well-developed. No distress.   HENT:   Head: Normocephalic and atraumatic.   Abdominal: Soft. He exhibits no distension.   Genitourinary:   Genitourinary Comments: Perianal exam: external: +left of anterior midline fissure. +enlarged tags   Musculoskeletal: Normal range of motion.   Neurological: He is alert.   Psychiatric: Thought content normal.         Assessment:   1. Fissure, anal     recurrent    Plan:    I discussed with patient options on treating fissure: lateral internal anal sphincterotomy, chemical sphincterotomy with Botox, or topical creams of nitroglycerin, diltiazem, or nifedipine.  I discussed risks and benefits of all.  Risks of the lateral internal anal sphincterotomy are small chance of fecal incontinence, slow wound healing, and it is an invasive procedure versus the other 2 options, but the benefit is 97% success in fixing a fissure.  Chemical sphincterotomy has the benefit of no permanent fecal " incontinence but a 80-85% success rate.  The topical creams have no incontinence risk, but are slow to heal the fissure and are only 80% successful.    Discussion would not recommend repeat botox, as he has had recurrence of fissure since last botox.    I wrote for diltiazem and lidocaine cream to be placed on the anus 3 times a day.  I recommended taking stool softener and fiber daily.  I gave out written instructions.      Consider LIAS if no improvement with conservative medical management.      RTC 6 week      Scribed for Gema Ritter MD by Lauryn Malik PA-C 6/15/2018  This patient was evaluated by me, recommendations made, documentation reviewed, edited, and revised by me, Gema Ritter MD

## 2018-08-14 ENCOUNTER — OFFICE VISIT (OUTPATIENT)
Dept: SURGERY | Facility: CLINIC | Age: 73
End: 2018-08-14

## 2018-08-14 ENCOUNTER — PREP FOR SURGERY (OUTPATIENT)
Dept: OTHER | Facility: HOSPITAL | Age: 73
End: 2018-08-14

## 2018-08-14 VITALS
TEMPERATURE: 97.8 F | HEART RATE: 104 BPM | SYSTOLIC BLOOD PRESSURE: 145 MMHG | WEIGHT: 284.5 LBS | DIASTOLIC BLOOD PRESSURE: 80 MMHG | BODY MASS INDEX: 47.4 KG/M2 | HEIGHT: 65 IN | RESPIRATION RATE: 19 BRPM | OXYGEN SATURATION: 98 %

## 2018-08-14 DIAGNOSIS — K60.2 ANAL FISSURE: Primary | ICD-10-CM

## 2018-08-14 DIAGNOSIS — K60.2 FISSURE, ANAL: Primary | ICD-10-CM

## 2018-08-14 PROCEDURE — 99212 OFFICE O/P EST SF 10 MIN: CPT | Performed by: COLON & RECTAL SURGERY

## 2018-08-14 RX ORDER — CEFAZOLIN SODIUM 2 G/100ML
2 INJECTION, SOLUTION INTRAVENOUS ONCE
Status: CANCELLED | OUTPATIENT
Start: 2018-08-30 | End: 2018-08-30

## 2018-08-14 NOTE — PROGRESS NOTES
"Kimberly Wei is a 72 y.o. male in for follow up of Fissure, anal    Pain with bm still  He states this is unbearable  He has used the dilt cream with no improvement   Chemical sphincterotomy was not helpful.    Past Medical History:   Diagnosis Date   • Abnormal thyroid blood test 10/2016   • Anal fissure 03/2017   • Chronic fatigue    • Colon polyps    • Depression    • Hemorrhoids 02/23/2017    INT/EXT   • Hypertension    • Proctalgia    • Snoring    • SOB (shortness of breath)      Past Surgical History:   Procedure Laterality Date   • ANAL SPHINCTEROTOMY N/A 05/18/2017    CHEMICAL WITH BOTOX, DR. HOMERO MALLOY AT [facility]   • COLONOSCOPY N/A 5/18/2017    5 MM SESSILE TUBULAR ADENOMA POLYP IN TRANSVERSE, 6 MM SESSILE HYPERPLASTIC POLYP IN DESCENDING, ANAL FISSURE, RESCOPE IN 5 YRS, DR. HOMERO MALLOY AT [facility]   • LAPAROSCOPIC CHOLECYSTECTOMY N/A 1990s           /80   Pulse 104   Temp 97.8 °F (36.6 °C)   Resp 19   Ht 165.1 cm (65\")   Wt 129 kg (284 lb 8 oz)   SpO2 98%   BMI 47.34 kg/m²   Body mass index is 47.34 kg/m².      PE:  Physical Exam   Constitutional: He appears well-developed. No distress.   HENT:   Head: Normocephalic and atraumatic.   Abdominal: Soft. He exhibits no distension.   Musculoskeletal: Normal range of motion.   Neurological: He is alert.   Psychiatric: Thought content normal.         Assessment:   1. Fissure, anal         Plan:  I recommend LIAS.  Discussed with pt 5-10% FI.  I described with patient typical surgical time, postop recovery including pain management, and restrictions. I discussed with patient risks, benefits, and alternatives.  The patient had opportunity to ask questions.  I answered all questions.  Patient understands and wishes to proceed with procedure.      Discussed  facial hair, copd , MO, and jesus alberto increase his anesthesia risk and that he should considering shaving.        "

## 2018-08-23 ENCOUNTER — APPOINTMENT (OUTPATIENT)
Dept: PREADMISSION TESTING | Facility: HOSPITAL | Age: 73
End: 2018-08-23

## 2018-08-23 VITALS
TEMPERATURE: 98.2 F | HEIGHT: 65 IN | RESPIRATION RATE: 20 BRPM | SYSTOLIC BLOOD PRESSURE: 131 MMHG | BODY MASS INDEX: 47.48 KG/M2 | WEIGHT: 285 LBS | DIASTOLIC BLOOD PRESSURE: 91 MMHG | HEART RATE: 115 BPM | OXYGEN SATURATION: 95 %

## 2018-08-23 LAB
ANION GAP SERPL CALCULATED.3IONS-SCNC: 10.4 MMOL/L
BUN BLD-MCNC: 16 MG/DL (ref 8–23)
BUN/CREAT SERPL: 13.3 (ref 7–25)
CALCIUM SPEC-SCNC: 9 MG/DL (ref 8.6–10.5)
CHLORIDE SERPL-SCNC: 103 MMOL/L (ref 98–107)
CO2 SERPL-SCNC: 25.6 MMOL/L (ref 22–29)
CREAT BLD-MCNC: 1.2 MG/DL (ref 0.76–1.27)
DEPRECATED RDW RBC AUTO: 47.5 FL (ref 37–54)
ERYTHROCYTE [DISTWIDTH] IN BLOOD BY AUTOMATED COUNT: 13.5 % (ref 11.5–14.5)
GFR SERPL CREATININE-BSD FRML MDRD: 59 ML/MIN/1.73
GLUCOSE BLD-MCNC: 104 MG/DL (ref 65–99)
HCT VFR BLD AUTO: 44.1 % (ref 40.4–52.2)
HGB BLD-MCNC: 14.3 G/DL (ref 13.7–17.6)
MCH RBC QN AUTO: 31.2 PG (ref 27–32.7)
MCHC RBC AUTO-ENTMCNC: 32.4 G/DL (ref 32.6–36.4)
MCV RBC AUTO: 96.3 FL (ref 79.8–96.2)
PLATELET # BLD AUTO: 171 10*3/MM3 (ref 140–500)
PMV BLD AUTO: 12.2 FL (ref 6–12)
POTASSIUM BLD-SCNC: 4.5 MMOL/L (ref 3.5–5.2)
RBC # BLD AUTO: 4.58 10*6/MM3 (ref 4.6–6)
SODIUM BLD-SCNC: 139 MMOL/L (ref 136–145)
WBC NRBC COR # BLD: 11.93 10*3/MM3 (ref 4.5–10.7)

## 2018-08-23 PROCEDURE — 93005 ELECTROCARDIOGRAM TRACING: CPT

## 2018-08-23 PROCEDURE — 80048 BASIC METABOLIC PNL TOTAL CA: CPT | Performed by: COLON & RECTAL SURGERY

## 2018-08-23 PROCEDURE — 85027 COMPLETE CBC AUTOMATED: CPT | Performed by: COLON & RECTAL SURGERY

## 2018-08-23 PROCEDURE — 93010 ELECTROCARDIOGRAM REPORT: CPT | Performed by: INTERNAL MEDICINE

## 2018-08-23 PROCEDURE — 36415 COLL VENOUS BLD VENIPUNCTURE: CPT

## 2018-08-23 RX ORDER — IBUPROFEN 200 MG
200 TABLET ORAL EVERY 6 HOURS PRN
COMMUNITY

## 2018-08-23 NOTE — DISCHARGE INSTRUCTIONS
Take the following medications the morning of surgery with a small sip of water:        General Instructions:  • Do not eat or drink anything after midnight the night before surgery.  • Infants may have breast milk up to four hours before surgery.  • Infants drinking formula may drink formula up to six hours before surgery.   • Patients who avoid smoking, chewing tobacco and alcohol for 4 weeks prior to surgery have a reduced risk of post-operative complications.  Quit smoking as many days before surgery as you can.  • Do not smoke, use chewing tobacco or drink alcohol the day of surgery.   • If applicable bring your C-PAP/ BI-PAP machine.  • Bring any papers given to you in the doctor’s office.  • Wear clean comfortable clothes and socks.  • Do not wear contact lenses or make-up.  Bring a case for your glasses.   • Bring crutches or walker if applicable.  • Remove all piercings.  Leave jewelry and any other valuables at home.  • Hair extensions with metal clips must be removed prior to surgery.  • The Pre-Admission Testing nurse will instruct you to bring medications if unable to obtain an accurate list in Pre-Admission Testing.        If you were given a blood bank ID arm band remember to bring it with you the day of surgery.    Preventing a Surgical Site Infection:  • For 2 to 3 days before surgery, avoid shaving with a razor because the razor can irritate skin and make it easier to develop an infection.    • Any areas of open skin can increase the risk of a post-operative wound infection by allowing bacteria to enter and travel throughout the body.  Notify your surgeon if you have any skin wounds / rashes even if it is not near the expected surgical site.  The area will need assessed to determine if surgery should be delayed until it is healed.  • The night prior to surgery sleep in a clean bed with clean clothing.  Do not allow pets to sleep with you.  • Shower on the morning of surgery using a fresh bar of  anti-bacterial soap (such as Dial) and clean washcloth.  Dry with a clean towel and dress in clean clothing.  • Ask your surgeon if you will be receiving antibiotics prior to surgery.  • Make sure you, your family, and all healthcare providers clean their hands with soap and water or an alcohol based hand  before caring for you or your wound.    Day of surgery:8/30/18   0630  Upon arrival, a Pre-op nurse and Anesthesiologist will review your health history, obtain vital signs, and answer questions you may have.  The only belongings needed at this time will be your home medications and if applicable your C-PAP/BI-PAP machine.  If you are staying overnight your family can leave the rest of your belongings in the car and bring them to your room later.  A Pre-op nurse will start an IV and you may receive medication in preparation for surgery, including something to help you relax.  Your family will be able to see you in the Pre-op area.  While you are in surgery your family should notify the waiting room  if they leave the waiting room area and provide a contact phone number.    Please be aware that surgery does come with discomfort.  We want to make every effort to control your discomfort so please discuss any uncontrolled symptoms with your nurse.   Your doctor will most likely have prescribed pain medications.      If you are going home after surgery you will receive individualized written care instructions before being discharged.  A responsible adult must drive you to and from the hospital on the day of your surgery and stay with you for 24 hours.    If you are staying overnight following surgery, you will be transported to your hospital room following the recovery period.  [facility] has all private rooms.    You have received a list of surgical assistants for your reference.  If you have any questions please call Pre-Admission Testing at [phone].  Deductibles and co-payments  are collected on the day of service. Please be prepared to pay the required co-pay, deductible or deposit on the day of service as defined by your plan.

## 2018-08-30 ENCOUNTER — HOSPITAL ENCOUNTER (OUTPATIENT)
Facility: HOSPITAL | Age: 73
Setting detail: HOSPITAL OUTPATIENT SURGERY
Discharge: HOME | End: 2018-08-30
Attending: COLON & RECTAL SURGERY | Admitting: COLON & RECTAL SURGERY

## 2018-08-30 ENCOUNTER — ANESTHESIA EVENT (OUTPATIENT)
Dept: PERIOP | Facility: HOSPITAL | Age: 73
End: 2018-08-30

## 2018-08-30 ENCOUNTER — ANESTHESIA (OUTPATIENT)
Dept: PERIOP | Facility: HOSPITAL | Age: 73
End: 2018-08-30

## 2018-08-30 VITALS
BODY MASS INDEX: 46.69 KG/M2 | DIASTOLIC BLOOD PRESSURE: 89 MMHG | RESPIRATION RATE: 16 BRPM | HEART RATE: 83 BPM | TEMPERATURE: 97.9 F | HEIGHT: 65 IN | SYSTOLIC BLOOD PRESSURE: 145 MMHG | OXYGEN SATURATION: 92 % | WEIGHT: 280.25 LBS

## 2018-08-30 DIAGNOSIS — K60.2 ANAL FISSURE: ICD-10-CM

## 2018-08-30 PROCEDURE — 25010000002 SUCCINYLCHOLINE PER 20 MG: Performed by: NURSE ANESTHETIST, CERTIFIED REGISTERED

## 2018-08-30 PROCEDURE — 25010000003 CEFAZOLIN IN DEXTROSE 2-4 GM/100ML-% SOLUTION: Performed by: COLON & RECTAL SURGERY

## 2018-08-30 PROCEDURE — 46260 REMOVE IN/EX HEM GROUPS 2+: CPT | Performed by: COLON & RECTAL SURGERY

## 2018-08-30 PROCEDURE — 25010000002 ONDANSETRON PER 1 MG: Performed by: NURSE ANESTHETIST, CERTIFIED REGISTERED

## 2018-08-30 PROCEDURE — 25010000002 KETOROLAC TROMETHAMINE PER 15 MG: Performed by: NURSE ANESTHETIST, CERTIFIED REGISTERED

## 2018-08-30 PROCEDURE — 25010000002 PROPOFOL 10 MG/ML EMULSION: Performed by: NURSE ANESTHETIST, CERTIFIED REGISTERED

## 2018-08-30 PROCEDURE — 25010000002 DEXAMETHASONE PER 1 MG: Performed by: NURSE ANESTHETIST, CERTIFIED REGISTERED

## 2018-08-30 PROCEDURE — 25010000002 FENTANYL CITRATE (PF) 100 MCG/2ML SOLUTION: Performed by: NURSE ANESTHETIST, CERTIFIED REGISTERED

## 2018-08-30 PROCEDURE — 88304 TISSUE EXAM BY PATHOLOGIST: CPT | Performed by: COLON & RECTAL SURGERY

## 2018-08-30 RX ORDER — FENTANYL CITRATE 50 UG/ML
INJECTION, SOLUTION INTRAMUSCULAR; INTRAVENOUS AS NEEDED
Status: DISCONTINUED | OUTPATIENT
Start: 2018-08-30 | End: 2018-08-30 | Stop reason: SURG

## 2018-08-30 RX ORDER — SUCCINYLCHOLINE CHLORIDE 20 MG/ML
INJECTION INTRAMUSCULAR; INTRAVENOUS AS NEEDED
Status: DISCONTINUED | OUTPATIENT
Start: 2018-08-30 | End: 2018-08-30 | Stop reason: SURG

## 2018-08-30 RX ORDER — SODIUM CHLORIDE, SODIUM LACTATE, POTASSIUM CHLORIDE, CALCIUM CHLORIDE 600; 310; 30; 20 MG/100ML; MG/100ML; MG/100ML; MG/100ML
9 INJECTION, SOLUTION INTRAVENOUS CONTINUOUS
Status: DISCONTINUED | OUTPATIENT
Start: 2018-08-30 | End: 2018-08-30 | Stop reason: HOSPADM

## 2018-08-30 RX ORDER — GABAPENTIN 400 MG/1
400 CAPSULE ORAL ONCE
Status: COMPLETED | OUTPATIENT
Start: 2018-08-30 | End: 2018-08-30

## 2018-08-30 RX ORDER — POLYETHYLENE GLYCOL 3350 17 G/17G
17 POWDER, FOR SOLUTION ORAL DAILY
Start: 2018-08-30 | End: 2019-01-28

## 2018-08-30 RX ORDER — PROMETHAZINE HYDROCHLORIDE 25 MG/1
12.5 TABLET ORAL ONCE AS NEEDED
Status: DISCONTINUED | OUTPATIENT
Start: 2018-08-30 | End: 2018-08-30 | Stop reason: HOSPADM

## 2018-08-30 RX ORDER — PROMETHAZINE HYDROCHLORIDE 25 MG/1
25 SUPPOSITORY RECTAL ONCE AS NEEDED
Status: DISCONTINUED | OUTPATIENT
Start: 2018-08-30 | End: 2018-08-30 | Stop reason: HOSPADM

## 2018-08-30 RX ORDER — SODIUM CHLORIDE 0.9 % (FLUSH) 0.9 %
1-10 SYRINGE (ML) INJECTION AS NEEDED
Status: DISCONTINUED | OUTPATIENT
Start: 2018-08-30 | End: 2018-08-30 | Stop reason: HOSPADM

## 2018-08-30 RX ORDER — MIDAZOLAM HYDROCHLORIDE 1 MG/ML
2 INJECTION INTRAMUSCULAR; INTRAVENOUS
Status: DISCONTINUED | OUTPATIENT
Start: 2018-08-30 | End: 2018-08-30 | Stop reason: HOSPADM

## 2018-08-30 RX ORDER — ACETAMINOPHEN 500 MG
1000 TABLET ORAL ONCE
Status: COMPLETED | OUTPATIENT
Start: 2018-08-30 | End: 2018-08-30

## 2018-08-30 RX ORDER — PROPOFOL 10 MG/ML
VIAL (ML) INTRAVENOUS AS NEEDED
Status: DISCONTINUED | OUTPATIENT
Start: 2018-08-30 | End: 2018-08-30 | Stop reason: SURG

## 2018-08-30 RX ORDER — PROMETHAZINE HYDROCHLORIDE 25 MG/1
25 TABLET ORAL ONCE AS NEEDED
Status: DISCONTINUED | OUTPATIENT
Start: 2018-08-30 | End: 2018-08-30 | Stop reason: HOSPADM

## 2018-08-30 RX ORDER — ONDANSETRON 2 MG/ML
4 INJECTION INTRAMUSCULAR; INTRAVENOUS ONCE AS NEEDED
Status: DISCONTINUED | OUTPATIENT
Start: 2018-08-30 | End: 2018-08-30 | Stop reason: HOSPADM

## 2018-08-30 RX ORDER — ONDANSETRON 2 MG/ML
INJECTION INTRAMUSCULAR; INTRAVENOUS AS NEEDED
Status: DISCONTINUED | OUTPATIENT
Start: 2018-08-30 | End: 2018-08-30 | Stop reason: SURG

## 2018-08-30 RX ORDER — HYDROCODONE BITARTRATE AND ACETAMINOPHEN 7.5; 325 MG/1; MG/1
1 TABLET ORAL ONCE AS NEEDED
Status: DISCONTINUED | OUTPATIENT
Start: 2018-08-30 | End: 2018-08-30 | Stop reason: HOSPADM

## 2018-08-30 RX ORDER — ONDANSETRON 4 MG/1
4 TABLET, FILM COATED ORAL ONCE AS NEEDED
Status: DISCONTINUED | OUTPATIENT
Start: 2018-08-30 | End: 2018-08-30 | Stop reason: HOSPADM

## 2018-08-30 RX ORDER — DIPHENHYDRAMINE HYDROCHLORIDE 50 MG/ML
12.5 INJECTION INTRAMUSCULAR; INTRAVENOUS
Status: DISCONTINUED | OUTPATIENT
Start: 2018-08-30 | End: 2018-08-30 | Stop reason: HOSPADM

## 2018-08-30 RX ORDER — LIDOCAINE HYDROCHLORIDE 20 MG/ML
INJECTION, SOLUTION INFILTRATION; PERINEURAL AS NEEDED
Status: DISCONTINUED | OUTPATIENT
Start: 2018-08-30 | End: 2018-08-30 | Stop reason: SURG

## 2018-08-30 RX ORDER — LABETALOL HYDROCHLORIDE 5 MG/ML
5 INJECTION, SOLUTION INTRAVENOUS
Status: DISCONTINUED | OUTPATIENT
Start: 2018-08-30 | End: 2018-08-30 | Stop reason: HOSPADM

## 2018-08-30 RX ORDER — CEFAZOLIN SODIUM 2 G/100ML
2 INJECTION, SOLUTION INTRAVENOUS ONCE
Status: COMPLETED | OUTPATIENT
Start: 2018-08-30 | End: 2018-08-30

## 2018-08-30 RX ORDER — FENTANYL CITRATE 50 UG/ML
50 INJECTION, SOLUTION INTRAMUSCULAR; INTRAVENOUS
Status: DISCONTINUED | OUTPATIENT
Start: 2018-08-30 | End: 2018-08-30 | Stop reason: HOSPADM

## 2018-08-30 RX ORDER — OXYCODONE HYDROCHLORIDE AND ACETAMINOPHEN 5; 325 MG/1; MG/1
2 TABLET ORAL ONCE AS NEEDED
Status: COMPLETED | OUTPATIENT
Start: 2018-08-30 | End: 2018-08-30

## 2018-08-30 RX ORDER — DEXAMETHASONE SODIUM PHOSPHATE 4 MG/ML
INJECTION, SOLUTION INTRA-ARTICULAR; INTRALESIONAL; INTRAMUSCULAR; INTRAVENOUS; SOFT TISSUE AS NEEDED
Status: DISCONTINUED | OUTPATIENT
Start: 2018-08-30 | End: 2018-08-30 | Stop reason: SURG

## 2018-08-30 RX ORDER — OXYCODONE HYDROCHLORIDE AND ACETAMINOPHEN 5; 325 MG/1; MG/1
TABLET ORAL
Qty: 46 TABLET | Refills: 0 | Status: SHIPPED | OUTPATIENT
Start: 2018-08-30 | End: 2018-09-14

## 2018-08-30 RX ORDER — MIDAZOLAM HYDROCHLORIDE 1 MG/ML
1 INJECTION INTRAMUSCULAR; INTRAVENOUS
Status: DISCONTINUED | OUTPATIENT
Start: 2018-08-30 | End: 2018-08-30 | Stop reason: HOSPADM

## 2018-08-30 RX ORDER — FLUMAZENIL 0.1 MG/ML
0.2 INJECTION INTRAVENOUS AS NEEDED
Status: DISCONTINUED | OUTPATIENT
Start: 2018-08-30 | End: 2018-08-30 | Stop reason: HOSPADM

## 2018-08-30 RX ORDER — EPHEDRINE SULFATE 50 MG/ML
5 INJECTION, SOLUTION INTRAVENOUS ONCE AS NEEDED
Status: DISCONTINUED | OUTPATIENT
Start: 2018-08-30 | End: 2018-08-30 | Stop reason: HOSPADM

## 2018-08-30 RX ORDER — LIDOCAINE 50 MG/G
OINTMENT TOPICAL EVERY 4 HOURS PRN
Qty: 1 TUBE | Refills: 5 | Status: SHIPPED | OUTPATIENT
Start: 2018-08-30 | End: 2018-09-04

## 2018-08-30 RX ORDER — OXYCODONE AND ACETAMINOPHEN 7.5; 325 MG/1; MG/1
1 TABLET ORAL ONCE AS NEEDED
Status: DISCONTINUED | OUTPATIENT
Start: 2018-08-30 | End: 2018-08-30 | Stop reason: HOSPADM

## 2018-08-30 RX ORDER — PROMETHAZINE HYDROCHLORIDE 25 MG/ML
12.5 INJECTION, SOLUTION INTRAMUSCULAR; INTRAVENOUS ONCE AS NEEDED
Status: DISCONTINUED | OUTPATIENT
Start: 2018-08-30 | End: 2018-08-30 | Stop reason: HOSPADM

## 2018-08-30 RX ORDER — NALOXONE HCL 0.4 MG/ML
0.2 VIAL (ML) INJECTION AS NEEDED
Status: DISCONTINUED | OUTPATIENT
Start: 2018-08-30 | End: 2018-08-30 | Stop reason: HOSPADM

## 2018-08-30 RX ORDER — LIDOCAINE HYDROCHLORIDE 10 MG/ML
0.5 INJECTION, SOLUTION EPIDURAL; INFILTRATION; INTRACAUDAL; PERINEURAL ONCE AS NEEDED
Status: DISCONTINUED | OUTPATIENT
Start: 2018-08-30 | End: 2018-08-30 | Stop reason: HOSPADM

## 2018-08-30 RX ORDER — KETOROLAC TROMETHAMINE 30 MG/ML
INJECTION, SOLUTION INTRAMUSCULAR; INTRAVENOUS AS NEEDED
Status: DISCONTINUED | OUTPATIENT
Start: 2018-08-30 | End: 2018-08-30 | Stop reason: SURG

## 2018-08-30 RX ORDER — FAMOTIDINE 10 MG/ML
20 INJECTION, SOLUTION INTRAVENOUS ONCE
Status: COMPLETED | OUTPATIENT
Start: 2018-08-30 | End: 2018-08-30

## 2018-08-30 RX ADMIN — KETOROLAC TROMETHAMINE 30 MG: 30 INJECTION, SOLUTION INTRAMUSCULAR; INTRAVENOUS at 09:38

## 2018-08-30 RX ADMIN — METRONIDAZOLE 500 MG: 500 INJECTION, SOLUTION INTRAVENOUS at 07:27

## 2018-08-30 RX ADMIN — FENTANYL CITRATE 100 MCG: 50 INJECTION INTRAMUSCULAR; INTRAVENOUS at 09:10

## 2018-08-30 RX ADMIN — GABAPENTIN 400 MG: 400 CAPSULE ORAL at 07:23

## 2018-08-30 RX ADMIN — PROPOFOL 200 MG: 10 INJECTION, EMULSION INTRAVENOUS at 09:15

## 2018-08-30 RX ADMIN — CEFAZOLIN SODIUM 2 G: 2 INJECTION, SOLUTION INTRAVENOUS at 09:03

## 2018-08-30 RX ADMIN — LIDOCAINE HYDROCHLORIDE 50 MG: 20 INJECTION, SOLUTION INFILTRATION; PERINEURAL at 09:15

## 2018-08-30 RX ADMIN — ONDANSETRON 4 MG: 2 INJECTION INTRAMUSCULAR; INTRAVENOUS at 09:38

## 2018-08-30 RX ADMIN — FAMOTIDINE 20 MG: 10 INJECTION INTRAVENOUS at 07:23

## 2018-08-30 RX ADMIN — OXYCODONE HYDROCHLORIDE AND ACETAMINOPHEN 2 TABLET: 5; 325 TABLET ORAL at 10:11

## 2018-08-30 RX ADMIN — DEXAMETHASONE SODIUM PHOSPHATE 8 MG: 4 INJECTION INTRA-ARTICULAR; INTRALESIONAL; INTRAMUSCULAR; INTRAVENOUS; SOFT TISSUE at 09:30

## 2018-08-30 RX ADMIN — SODIUM CHLORIDE, POTASSIUM CHLORIDE, SODIUM LACTATE AND CALCIUM CHLORIDE 9 ML/HR: 600; 310; 30; 20 INJECTION, SOLUTION INTRAVENOUS at 06:57

## 2018-08-30 RX ADMIN — ACETAMINOPHEN 1000 MG: 500 TABLET, FILM COATED ORAL at 07:23

## 2018-08-30 RX ADMIN — SUCCINYLCHOLINE CHLORIDE 200 MG: 20 INJECTION, SOLUTION INTRAMUSCULAR; INTRAVENOUS; PARENTERAL at 09:15

## 2018-08-30 NOTE — ANESTHESIA PREPROCEDURE EVALUATION
Anesthesia Evaluation     NPO Solid Status: > 8 hours  NPO Liquid Status: > 8 hours           Airway   Mallampati: III  Neck ROM: full  Possible difficult intubation  Dental - normal exam     Pulmonary - normal exam   (+) a smoker Former, COPD mild, shortness of breath, sleep apnea,   Cardiovascular - normal exam    ECG reviewed    (+) hypertension,   (-) angina      Neuro/Psych  (+) psychiatric history Depression,     GI/Hepatic/Renal/Endo    (+) morbid obesity,      Musculoskeletal     Abdominal    Substance History      OB/GYN          Other        ROS/Med Hx Other: Noncompliant JUAN                 Anesthesia Plan    ASA 3     general     intravenous induction   Anesthetic plan and risks discussed with patient.

## 2018-08-30 NOTE — ANESTHESIA POSTPROCEDURE EVALUATION
"Patient: Kimberly Wei    Procedure Summary     Date:  08/30/18 Room / Location:  [facility] OR 03 / [facility] MAIN OR    Anesthesia Start:  0903 Anesthesia Stop:  0958    Procedure:  INTERNAL/ EXTERNAL HEMORRHOIDECTOMY X2,  INTERNAL HEMORRHOID LIGATION X1, ANAL TAG EXCISION (N/A Anus) Diagnosis:       Anal fissure      (Anal fissure [K60.2])    Surgeon:  Gema Ritter MD Provider:  James Vasquez MD    Anesthesia Type:  general ASA Status:  3          Anesthesia Type: general  Last vitals  BP   145/89 (08/30/18 1100)   Temp   36.6 °C (97.9 °F) (08/30/18 0955)   Pulse   83 (08/30/18 1110)   Resp   16 (08/30/18 1110)     SpO2   92 % (08/30/18 1110)     Post Anesthesia Care and Evaluation    Patient location during evaluation: bedside  Patient participation: complete - patient participated  Level of consciousness: awake  Pain management: adequate  Airway patency: patent  Anesthetic complications: No anesthetic complications    Cardiovascular status: acceptable  Respiratory status: acceptable  Hydration status: acceptable    Comments: */89   Pulse 83   Temp 36.6 °C (97.9 °F) (Oral)   Resp 16   Ht 165.1 cm (65\")   Wt 127 kg (280 lb 4 oz)   SpO2 92%   BMI 46.64 kg/m²         "

## 2018-08-30 NOTE — ANESTHESIA PROCEDURE NOTES
Airway  Urgency: elective    Difficult airway    General Information and Staff    Patient location during procedure: OR  Anesthesiologist: LOI DOHERTY  CRNA: SURY SANTOS    Indications and Patient Condition  Indications for airway management: airway protection    Preoxygenated: yes  Mask difficulty assessment: 2 - vent by mask + OA or adjuvant +/- NMBA    Final Airway Details  Final airway type: endotracheal airway      Successful airway: ETT  Cuffed: yes   Successful intubation technique: direct laryngoscopy  Facilitating devices/methods: intubating stylet  Endotracheal tube insertion site: oral  Blade: CMAC  Blade size: D  ETT size: 7.5 mm  Cormack-Lehane Classification: grade I - full view of glottis  Placement verified by: chest auscultation and capnometry   Cuff volume (mL): 6  Measured from: lips  ETT to lips (cm): 22  Number of attempts at approach: 1    Additional Comments  1st intubation esophageal and removed: 2nd intubation with CMAC with success: EBBS: ETCO2+: Atraumatic intubation; Lips and teeth same as pre op

## 2018-08-31 LAB
LAB AP CASE REPORT: NORMAL
PATH REPORT.FINAL DX SPEC: NORMAL
PATH REPORT.GROSS SPEC: NORMAL

## 2018-09-04 DIAGNOSIS — K64.9 HEMORRHOIDS, UNSPECIFIED HEMORRHOID TYPE: Primary | ICD-10-CM

## 2018-09-14 ENCOUNTER — OFFICE VISIT (OUTPATIENT)
Dept: SURGERY | Facility: CLINIC | Age: 73
End: 2018-09-14

## 2018-09-14 VITALS
BODY MASS INDEX: 46.65 KG/M2 | TEMPERATURE: 97.9 F | HEIGHT: 65 IN | HEART RATE: 82 BPM | SYSTOLIC BLOOD PRESSURE: 120 MMHG | WEIGHT: 280 LBS | RESPIRATION RATE: 19 BRPM | DIASTOLIC BLOOD PRESSURE: 80 MMHG | OXYGEN SATURATION: 99 %

## 2018-09-14 DIAGNOSIS — K64.9 HEMORRHOIDS, UNSPECIFIED HEMORRHOID TYPE: Primary | ICD-10-CM

## 2018-09-14 PROCEDURE — 99024 POSTOP FOLLOW-UP VISIT: CPT | Performed by: COLON & RECTAL SURGERY

## 2018-09-14 RX ORDER — OXYCODONE HYDROCHLORIDE AND ACETAMINOPHEN 5; 325 MG/1; MG/1
TABLET ORAL
Qty: 36 TABLET | Refills: 0 | Status: SHIPPED | OUTPATIENT
Start: 2018-09-14 | End: 2018-11-09

## 2018-09-14 NOTE — PROGRESS NOTES
"Kimberly Wei is a 72 y.o. male in for follow up of hemorroidectomy  Rb no  Pain  Fiber +ss  Tylenol and ibuopren      /80   Pulse 82   Temp 97.9 °F (36.6 °C)   Resp 19   Ht 165.1 cm (65\")   Wt 127 kg (280 lb)   SpO2 99%   BMI 46.59 kg/m²   Body mass index is 46.59 kg/m².      PE:  Physical Exam   Constitutional: He appears well-developed. No distress.   HENT:   Head: Normocephalic and atraumatic.   Abdominal: Soft. He exhibits no distension.   Genitourinary:   Genitourinary Comments: Perianal exam: external incisions healing       Musculoskeletal: Normal range of motion.   Neurological: He is alert.   Psychiatric: Thought content normal.         Assessment:   1. Hemorrhoids, unspecified hemorrhoid type       S/p hemorroidectomy  Plan:  Fissure healing on exam in or and sphincter not tight, so did not do LIAS.  Large hems and reassured pt this is nl post op course  Refill pain meds  Continue to take fiber and ss        "

## 2018-09-18 ENCOUNTER — TELEPHONE (OUTPATIENT)
Dept: INTERNAL MEDICINE | Facility: CLINIC | Age: 73
End: 2018-09-18

## 2018-09-18 DIAGNOSIS — I10 ESSENTIAL HYPERTENSION: ICD-10-CM

## 2018-09-18 RX ORDER — LISINOPRIL 20 MG/1
20 TABLET ORAL DAILY
Qty: 90 TABLET | Refills: 3 | Status: SHIPPED | OUTPATIENT
Start: 2018-09-18 | End: 2018-09-18 | Stop reason: SDUPTHER

## 2018-09-18 RX ORDER — LISINOPRIL 20 MG/1
20 TABLET ORAL DAILY
Qty: 90 TABLET | Refills: 0 | Status: SHIPPED | OUTPATIENT
Start: 2018-09-18 | End: 2018-12-27 | Stop reason: SDUPTHER

## 2018-09-18 NOTE — TELEPHONE ENCOUNTER
Sent to pharmacy.  Informed pt that he will need to be seen since has not been seen since   2/17.  He told me he was off dr's right now.  I told him we will not issue anyother  Refills until seen.  His wife is aware  also      ----- Message from Silke Zavala sent at 9/18/2018 10:41 AM EDT -----  Regarding: MED TO NEW PHARMACY  Von    Patient's wife phoned stating they have been getting Lisinopril filled through [facility] pharmacy, but want to change it to the local [facility] in [place].  Pt has a few pills left.  Requesting 90 days supply as well.

## 2018-10-05 ENCOUNTER — OFFICE VISIT (OUTPATIENT)
Dept: SURGERY | Facility: CLINIC | Age: 73
End: 2018-10-05

## 2018-10-05 VITALS
OXYGEN SATURATION: 96 % | WEIGHT: 281 LBS | HEART RATE: 78 BPM | HEIGHT: 65 IN | TEMPERATURE: 97.8 F | DIASTOLIC BLOOD PRESSURE: 80 MMHG | SYSTOLIC BLOOD PRESSURE: 123 MMHG | BODY MASS INDEX: 46.82 KG/M2

## 2018-10-05 DIAGNOSIS — K64.9 HEMORRHOIDS, UNSPECIFIED HEMORRHOID TYPE: Primary | ICD-10-CM

## 2018-10-05 PROCEDURE — 99024 POSTOP FOLLOW-UP VISIT: CPT | Performed by: PHYSICIAN ASSISTANT

## 2018-10-05 NOTE — PROGRESS NOTES
"Kimberly Wei is a 72 y.o. male in for follow up of Hemorrhoids, unspecified hemorrhoid type  s/p internal and external hemorrhoidectomy x 2, excision of anal tag 8/30/2018    Pt states he has discomfort with BM  He is still taking rx pain medication  He is not taking any otc pain relievers  He has been using PrepH and lidocaine, which helps a little bit    He just had 3 days between BMs.  He states he is reluctant to go due to pain  He takes 1 dose unknown stool softener bid  He takes miralax in qam  He is also taking fiber  Eating prunes    Bleeding comes and goes, but is getting better    No f/c  No n/v    /80   Pulse 78   Temp 97.8 °F (36.6 °C)   Ht 165.1 cm (65\")   Wt 127 kg (281 lb)   SpO2 96%   BMI 46.76 kg/m²   Body mass index is 46.76 kg/m².      PE:  Physical Exam   Constitutional: He appears well-developed. No distress.   HENT:   Head: Normocephalic and atraumatic.   Abdominal: Soft. He exhibits no distension.   Genitourinary:   Genitourinary Comments: Perianal exam: external: healing well with mild edema appropriate to stage of healing.  No blood or drainage.  No erythema.  No appearance infection   Musculoskeletal: Normal range of motion.   Neurological: He is alert.   Psychiatric: Thought content normal.         Assessment:   1. Hemorrhoids, unspecified hemorrhoid type    s/p internal and external hemorrhoidectomy x 2, excision of anal tag 8/30/2018    Plan:    He is healing well postop.  Discussed he needs to transition to otc pain meds, and gave written instructions.  For easier BMs, increase miralax to bid prn stool consistency.  D/c PrepH; he can continue to use topical lidocaine prn.    Discussed return precautions, including but not limited to: call or come in if any fever/chills, nausea/vomiting, new/worsening rectal pain or pressure, new/worsening anorectal bleeding or drainage (especially if malodorous), issues with bowel/bladder function, or any other concerning symptoms    RTC " 4-6 weeks      Lauryn Malik PA-C 10/5/2018

## 2018-11-09 ENCOUNTER — OFFICE VISIT (OUTPATIENT)
Dept: SURGERY | Facility: CLINIC | Age: 73
End: 2018-11-09

## 2018-11-09 VITALS
HEART RATE: 98 BPM | BODY MASS INDEX: 47.82 KG/M2 | RESPIRATION RATE: 18 BRPM | HEIGHT: 65 IN | WEIGHT: 287 LBS | SYSTOLIC BLOOD PRESSURE: 130 MMHG | TEMPERATURE: 98.1 F | OXYGEN SATURATION: 98 % | DIASTOLIC BLOOD PRESSURE: 80 MMHG

## 2018-11-09 DIAGNOSIS — K64.9 HEMORRHOIDS, UNSPECIFIED HEMORRHOID TYPE: Primary | ICD-10-CM

## 2018-11-09 PROCEDURE — 99024 POSTOP FOLLOW-UP VISIT: CPT | Performed by: COLON & RECTAL SURGERY

## 2018-11-11 NOTE — PROGRESS NOTES
"Kimberly Wei is a 73 y.o. male in for follow up of Hemorrhoids, unspecified hemorrhoid type  S/p hem x 3    No blood  No pain   bm reg        /80   Pulse 98   Temp 98.1 °F (36.7 °C)   Resp 18   Ht 165.1 cm (65\")   Wt 130 kg (287 lb)   SpO2 98%   BMI 47.76 kg/m²   Body mass index is 47.76 kg/m².      PE:  Physical Exam   Constitutional: He appears well-developed. No distress.   HENT:   Head: Normocephalic and atraumatic.   Abdominal: Soft. He exhibits no distension.   Genitourinary:   Genitourinary Comments: Perianal exam: external hem - incisions well healed       Musculoskeletal: Normal range of motion.   Neurological: He is alert.   Psychiatric: Thought content normal.         Assessment:   1. Hemorrhoids, unspecified hemorrhoid type         Plan:    rtc prn          "

## 2018-12-27 DIAGNOSIS — I10 ESSENTIAL HYPERTENSION: ICD-10-CM

## 2018-12-27 RX ORDER — LISINOPRIL 20 MG/1
TABLET ORAL
Qty: 90 TABLET | Refills: 0 | Status: SHIPPED | OUTPATIENT
Start: 2018-12-27 | End: 2019-01-28

## 2018-12-27 RX ORDER — LISINOPRIL 20 MG/1
20 TABLET ORAL DAILY
Qty: 90 TABLET | Refills: 0 | Status: SHIPPED | OUTPATIENT
Start: 2018-12-27 | End: 2019-01-28 | Stop reason: SDUPTHER

## 2019-01-01 DIAGNOSIS — I10 ESSENTIAL HYPERTENSION: ICD-10-CM

## 2019-01-01 RX ORDER — LISINOPRIL 20 MG/1
TABLET ORAL
Qty: 90 TABLET | Refills: 0 | Status: SHIPPED | OUTPATIENT
Start: 2019-01-01 | End: 2020-01-01

## 2019-01-28 ENCOUNTER — OFFICE VISIT (OUTPATIENT)
Dept: INTERNAL MEDICINE | Facility: CLINIC | Age: 74
End: 2019-01-28

## 2019-01-28 VITALS
TEMPERATURE: 97.7 F | SYSTOLIC BLOOD PRESSURE: 138 MMHG | BODY MASS INDEX: 47.82 KG/M2 | HEIGHT: 65 IN | RESPIRATION RATE: 18 BRPM | DIASTOLIC BLOOD PRESSURE: 86 MMHG | HEART RATE: 87 BPM | WEIGHT: 287 LBS | OXYGEN SATURATION: 97 %

## 2019-01-28 DIAGNOSIS — I10 ESSENTIAL HYPERTENSION: Primary | ICD-10-CM

## 2019-01-28 DIAGNOSIS — J44.9 CHRONIC OBSTRUCTIVE PULMONARY DISEASE, UNSPECIFIED COPD TYPE (HCC): ICD-10-CM

## 2019-01-28 DIAGNOSIS — Z00.00 ROUTINE HEALTH MAINTENANCE: ICD-10-CM

## 2019-01-28 LAB
ALBUMIN SERPL-MCNC: 4 G/DL (ref 3.5–5.2)
ALBUMIN/GLOB SERPL: 1.3 G/DL
ALP SERPL-CCNC: 51 U/L (ref 40–129)
ALT SERPL-CCNC: 23 U/L (ref 5–41)
AST SERPL-CCNC: 17 U/L (ref 5–40)
BASOPHILS # BLD AUTO: 0.07 10*3/MM3 (ref 0–0.2)
BASOPHILS NFR BLD AUTO: 0.8 % (ref 0–2)
BILIRUB SERPL-MCNC: 0.2 MG/DL (ref 0.2–1.2)
BUN SERPL-MCNC: 19 MG/DL (ref 8–23)
BUN/CREAT SERPL: 15 (ref 7–25)
CALCIUM SERPL-MCNC: 9.2 MG/DL (ref 8.8–10.5)
CHLORIDE SERPL-SCNC: 105 MMOL/L (ref 98–107)
CHOLEST SERPL-MCNC: 192 MG/DL (ref 0–200)
CHOLEST/HDLC SERPL: 4.57 {RATIO}
CO2 SERPL-SCNC: 27.7 MMOL/L (ref 22–29)
CREAT SERPL-MCNC: 1.27 MG/DL (ref 0.76–1.27)
EOSINOPHIL # BLD AUTO: 0.33 10*3/MM3 (ref 0.1–0.3)
EOSINOPHIL NFR BLD AUTO: 4 % (ref 0–4)
ERYTHROCYTE [DISTWIDTH] IN BLOOD BY AUTOMATED COUNT: 13.1 % (ref 11.5–14.5)
GLOBULIN SER CALC-MCNC: 3.2 GM/DL
GLUCOSE SERPL-MCNC: 90 MG/DL (ref 65–99)
HCT VFR BLD AUTO: 46.1 % (ref 42–52)
HDLC SERPL-MCNC: 42 MG/DL (ref 40–60)
HGB BLD-MCNC: 14.5 G/DL (ref 14–18)
IMM GRANULOCYTES # BLD AUTO: 0.02 10*3/MM3 (ref 0–0.03)
IMM GRANULOCYTES NFR BLD AUTO: 0.2 % (ref 0–0.5)
LDLC SERPL CALC-MCNC: 119 MG/DL (ref 0–100)
LYMPHOCYTES # BLD AUTO: 1.98 10*3/MM3 (ref 0.6–4.8)
LYMPHOCYTES NFR BLD AUTO: 24 % (ref 20–45)
MCH RBC QN AUTO: 30.3 PG (ref 27–31)
MCHC RBC AUTO-ENTMCNC: 31.5 G/DL (ref 31–37)
MCV RBC AUTO: 96.4 FL (ref 80–94)
MONOCYTES # BLD AUTO: 0.81 10*3/MM3 (ref 0–1)
MONOCYTES NFR BLD AUTO: 9.8 % (ref 3–8)
NEUTROPHILS # BLD AUTO: 5.05 10*3/MM3 (ref 1.5–8.3)
NEUTROPHILS NFR BLD AUTO: 61.2 % (ref 45–70)
NRBC BLD AUTO-RTO: 0 /100 WBC (ref 0–0)
PLATELET # BLD AUTO: 186 10*3/MM3 (ref 140–500)
POTASSIUM SERPL-SCNC: 4.6 MMOL/L (ref 3.5–5.2)
PROT SERPL-MCNC: 7.2 G/DL (ref 6–8.5)
RBC # BLD AUTO: 4.78 10*6/MM3 (ref 4.7–6.1)
SODIUM SERPL-SCNC: 144 MMOL/L (ref 136–145)
T4 FREE SERPL-MCNC: 0.94 NG/DL (ref 0.93–1.7)
TRIGL SERPL-MCNC: 156 MG/DL (ref 0–150)
TSH SERPL DL<=0.005 MIU/L-ACNC: 2.62 MIU/ML (ref 0.27–4.2)
VLDLC SERPL CALC-MCNC: 31.2 MG/DL (ref 8–32)
WBC # BLD AUTO: 8.26 10*3/MM3 (ref 4.8–10.8)

## 2019-01-28 PROCEDURE — 99214 OFFICE O/P EST MOD 30 MIN: CPT | Performed by: FAMILY MEDICINE

## 2019-01-28 RX ORDER — ALBUTEROL SULFATE 90 UG/1
1 AEROSOL, METERED RESPIRATORY (INHALATION) AS NEEDED
Qty: 1 INHALER | Refills: 2 | Status: SHIPPED | OUTPATIENT
Start: 2019-01-28

## 2019-01-28 RX ORDER — LISINOPRIL 20 MG/1
20 TABLET ORAL DAILY
Qty: 90 TABLET | Refills: 1 | Status: SHIPPED | OUTPATIENT
Start: 2019-01-28 | End: 2019-09-19 | Stop reason: SDUPTHER

## 2019-01-28 NOTE — PROGRESS NOTES
Kimberly Wei is a 73 y.o. male, who presents with a chief complaint of   Chief Complaint   Patient presents with   • Hypertension     meds refill,pain with anus, itching both calves       HPI     1. HTN.  He is tolerating lisinopril.  Denies chest pain, dizziness.    2. COPD.  Denies flare.  He is no longer seeing Dr. Hampton.  He has tried several different inhalers.    3. Routine health maint.  Had colonoscopy 5/2017 and flu vaccine done this fall.    The following portions of the patient's history were reviewed and updated as appropriate: allergies, current medications, past family history, past medical history, past social history, past surgical history and problem list.    Allergies: Patient has no known allergies.    Review of Systems   Constitutional: Negative.    HENT: Negative.    Eyes: Negative.    Respiratory: Negative.    Cardiovascular: Negative.    Gastrointestinal: Negative.    Endocrine: Negative.    Genitourinary: Negative.    Musculoskeletal: Negative.    Skin: Negative.    Allergic/Immunologic: Negative.    Neurological: Negative.    Hematological: Negative.    Psychiatric/Behavioral: Negative.            Wt Readings from Last 3 Encounters:   01/28/19 130 kg (287 lb)   11/09/18 130 kg (287 lb)   10/05/18 127 kg (281 lb)     Temp Readings from Last 3 Encounters:   01/28/19 97.7 °F (36.5 °C)   11/09/18 98.1 °F (36.7 °C)   10/05/18 97.8 °F (36.6 °C)     BP Readings from Last 3 Encounters:   01/28/19 138/86   11/09/18 130/80   10/05/18 123/80     Pulse Readings from Last 3 Encounters:   01/28/19 87   11/09/18 98   10/05/18 78     Body mass index is 47.76 kg/m².  @LASTSAO2(3)@    Physical Exam   Constitutional: He is oriented to person, place, and time. He appears well-developed and well-nourished. No distress.   HENT:   Head: Normocephalic and atraumatic.   Eyes: Conjunctivae and EOM are normal.   Neck: Neck supple. No thyromegaly present.   Cardiovascular: Normal rate, regular rhythm and normal  heart sounds.   Pulmonary/Chest: Effort normal. No respiratory distress.   Musculoskeletal: Normal range of motion. He exhibits no edema.   Neurological: He is alert and oriented to person, place, and time.   Skin: Skin is warm and dry.   Psychiatric: He has a normal mood and affect. His behavior is normal.   Nursing note and vitals reviewed.          Kimberly was seen today for hypertension.    Diagnoses and all orders for this visit:    Essential hypertension  -     lisinopril (PRINIVIL,ZESTRIL) 20 MG tablet; Take 1 tablet by mouth Daily.  -     Comprehensive Metabolic Panel  -     Lipid Panel With / Chol / HDL Ratio  -     TSH  -     T4, Free    Chronic obstructive pulmonary disease, unspecified COPD type (CMS/HCC)  -     VENTOLIN  (90 Base) MCG/ACT inhaler; Inhale 1 puff As Needed for Wheezing or Shortness of Air.  -     CBC & Differential    Routine health maintenance      1. HTN.  Controlled.  Continue same.  Labs today.    2. COPD.  No longer seeing pulmonologist.  Didn't respond well to a variety of inhalers.  Refilled albuterol.    3. Routine health maint.  Declines low dose screening CT.  Colonoscopy UTD 5/2017.    Outpatient Medications Prior to Visit   Medication Sig Dispense Refill   • ibuprofen (ADVIL,MOTRIN) 200 MG tablet Take 200 mg by mouth Every 6 (Six) Hours As Needed for Mild Pain .     • buPROPion XL (WELLBUTRIN XL) 300 MG 24 hr tablet Take 1 tablet by mouth Daily. 90 tablet 3   • lisinopril (PRINIVIL,ZESTRIL) 20 MG tablet TAKE 1 TABLET BY MOUTH ONCE DAILY 90 tablet 0   • lisinopril (PRINIVIL,ZESTRIL) 20 MG tablet Take 1 tablet by mouth Daily. 90 tablet 0   • VENTOLIN  (90 BASE) MCG/ACT inhaler Inhale 1 puff As Needed for Wheezing or Shortness of Air.     • polyethylene glycol (MIRALAX) packet Take 17 g by mouth Daily.       No facility-administered medications prior to visit.      New Medications Ordered This Visit   Medications   • VENTOLIN  (90 Base) MCG/ACT inhaler      Sig: Inhale 1 puff As Needed for Wheezing or Shortness of Air.     Dispense:  1 inhaler     Refill:  2   • lisinopril (PRINIVIL,ZESTRIL) 20 MG tablet     Sig: Take 1 tablet by mouth Daily.     Dispense:  90 tablet     Refill:  1     [unfilled]  Medications Discontinued During This Encounter   Medication Reason   • buPROPion XL (WELLBUTRIN XL) 300 MG 24 hr tablet *Therapy completed   • lisinopril (PRINIVIL,ZESTRIL) 20 MG tablet *Therapy completed   • polyethylene glycol (MIRALAX) packet *Therapy completed   • VENTOLIN  (90 BASE) MCG/ACT inhaler Reorder   • lisinopril (PRINIVIL,ZESTRIL) 20 MG tablet Reorder         Return in about 6 months (around 7/28/2019).

## 2019-01-30 ENCOUNTER — TELEPHONE (OUTPATIENT)
Dept: INTERNAL MEDICINE | Facility: CLINIC | Age: 74
End: 2019-01-30

## 2019-07-18 ENCOUNTER — OFFICE VISIT (OUTPATIENT)
Dept: INTERNAL MEDICINE | Facility: CLINIC | Age: 74
End: 2019-07-18

## 2019-07-18 VITALS
BODY MASS INDEX: 44.82 KG/M2 | SYSTOLIC BLOOD PRESSURE: 102 MMHG | TEMPERATURE: 98.1 F | OXYGEN SATURATION: 95 % | DIASTOLIC BLOOD PRESSURE: 78 MMHG | WEIGHT: 269 LBS | HEART RATE: 107 BPM | HEIGHT: 65 IN | RESPIRATION RATE: 16 BRPM

## 2019-07-18 DIAGNOSIS — I10 ESSENTIAL HYPERTENSION: ICD-10-CM

## 2019-07-18 DIAGNOSIS — J44.9 CHRONIC OBSTRUCTIVE PULMONARY DISEASE, UNSPECIFIED COPD TYPE (HCC): ICD-10-CM

## 2019-07-18 DIAGNOSIS — L29.9 ITCH OF SKIN: Primary | ICD-10-CM

## 2019-07-18 PROCEDURE — 99214 OFFICE O/P EST MOD 30 MIN: CPT | Performed by: FAMILY MEDICINE

## 2019-07-18 NOTE — PROGRESS NOTES
Kimberly Wei is a 73 y.o. male, who presents with a chief complaint of   Chief Complaint   Patient presents with   • COPD     soa on  exertion   • Itching     bilateral feet       HPI     1. Itching of skin.  His hands were itching but this has improved.  He used anti-itch cream.  Now he complains of itching of the shins, especially at night.  This has improved a little with anti-itch cream.  He denies rash or pain.    2. HTN.  Tolerating lisinopril.  He is working on his diet and has lost 20 pounds.    3. COPD.  Pt denies any symptoms at this time.    The following portions of the patient's history were reviewed and updated as appropriate: allergies, current medications, past family history, past medical history, past social history, past surgical history and problem list.    Allergies: Patient has no known allergies.    Review of Systems   Constitutional: Negative.    HENT: Negative.    Eyes: Negative.    Respiratory: Negative.    Cardiovascular: Negative.    Gastrointestinal: Negative.    Endocrine: Negative.    Genitourinary: Negative.    Musculoskeletal: Positive for arthralgias.   Skin:        itch   Allergic/Immunologic: Negative.    Neurological: Negative.    Hematological: Negative.    Psychiatric/Behavioral: Negative.              Wt Readings from Last 3 Encounters:   07/18/19 122 kg (269 lb)   01/28/19 130 kg (287 lb)   11/09/18 130 kg (287 lb)     Temp Readings from Last 3 Encounters:   07/18/19 98.1 °F (36.7 °C) (Oral)   01/28/19 97.7 °F (36.5 °C)   11/09/18 98.1 °F (36.7 °C)     BP Readings from Last 3 Encounters:   07/18/19 102/78   01/28/19 138/86   11/09/18 130/80     Pulse Readings from Last 3 Encounters:   07/18/19 107   01/28/19 87   11/09/18 98     Body mass index is 44.76 kg/m².  @LASTSAO2(3)@    Physical Exam   Constitutional: He is oriented to person, place, and time. He appears well-developed and well-nourished. No distress.   HENT:   Head: Normocephalic and atraumatic.   Eyes:  Conjunctivae and EOM are normal.   Neck: Neck supple. No thyromegaly present.   Cardiovascular: Normal rate, regular rhythm and normal heart sounds.   Pulmonary/Chest: Effort normal. No respiratory distress.   Musculoskeletal: Normal range of motion. He exhibits no edema.   Neurological: He is alert and oriented to person, place, and time.   Skin: Skin is warm and dry. No rash noted.   Psychiatric: He has a normal mood and affect. His behavior is normal.   Nursing note and vitals reviewed.      Results for orders placed or performed in visit on 01/28/19   Comprehensive Metabolic Panel   Result Value Ref Range    Glucose 90 65 - 99 mg/dL    BUN 19 8 - 23 mg/dL    Creatinine 1.27 0.76 - 1.27 mg/dL    eGFR Non African Am 55 (L) >60 mL/min/1.73    eGFR African Am 67 >60 mL/min/1.73    BUN/Creatinine Ratio 15.0 7.0 - 25.0    Sodium 144 136 - 145 mmol/L    Potassium 4.6 3.5 - 5.2 mmol/L    Chloride 105 98 - 107 mmol/L    Total CO2 27.7 22.0 - 29.0 mmol/L    Calcium 9.2 8.8 - 10.5 mg/dL    Total Protein 7.2 6.0 - 8.5 g/dL    Albumin 4.00 3.50 - 5.20 g/dL    Globulin 3.2 gm/dL    A/G Ratio 1.3 g/dL    Total Bilirubin 0.2 0.2 - 1.2 mg/dL    Alkaline Phosphatase 51 40 - 129 U/L    AST (SGOT) 17 5 - 40 U/L    ALT (SGPT) 23 5 - 41 U/L   Lipid Panel With / Chol / HDL Ratio   Result Value Ref Range    Total Cholesterol 192 0 - 200 mg/dL    Triglycerides 156 (H) 0 - 150 mg/dL    HDL Cholesterol 42 40 - 60 mg/dL    VLDL Cholesterol 31.2 8 - 32 mg/dL    LDL Cholesterol  119 (H) 0 - 100 mg/dL    Chol/HDL Ratio 4.57    TSH   Result Value Ref Range    TSH 2.620 0.270 - 4.200 mIU/mL   T4, Free   Result Value Ref Range    Free T4 0.94 0.93 - 1.70 ng/dL   CBC & Differential   Result Value Ref Range    WBC 8.26 4.80 - 10.80 10*3/mm3    RBC 4.78 4.70 - 6.10 10*6/mm3    Hemoglobin 14.5 14.0 - 18.0 g/dL    Hematocrit 46.1 42.0 - 52.0 %    MCV 96.4 (H) 80.0 - 94.0 fL    MCH 30.3 27.0 - 31.0 pg    MCHC 31.5 31.0 - 37.0 g/dL    RDW 13.1 11.5 -  14.5 %    Platelets 186 140 - 500 10*3/mm3    Neutrophil Rel % 61.2 45.0 - 70.0 %    Lymphocyte Rel % 24.0 20.0 - 45.0 %    Monocyte Rel % 9.8 (H) 3.0 - 8.0 %    Eosinophil Rel % 4.0 0.0 - 4.0 %    Basophil Rel % 0.8 0.0 - 2.0 %    Neutrophils Absolute 5.05 1.50 - 8.30 10*3/mm3    Lymphocytes Absolute 1.98 0.60 - 4.80 10*3/mm3    Monocytes Absolute 0.81 0.00 - 1.00 10*3/mm3    Eosinophils Absolute 0.33 (H) 0.10 - 0.30 10*3/mm3    Basophils Absolute 0.07 0.00 - 0.20 10*3/mm3    Immature Granulocyte Rel % 0.2 0.0 - 0.5 %    Immature Grans Absolute 0.02 0.00 - 0.03 10*3/mm3    nRBC 0.0 0.0 - 0.0 /100 WBC           Kimberly was seen today for copd and itching.    Diagnoses and all orders for this visit:    Itch of skin    Essential hypertension    Chronic obstructive pulmonary disease, unspecified COPD type (CMS/[facility])      1. Itch of shins.  No rash but signs of early venous stasis.  Options discussed.  Pt states it has improved and wants to just try moisturization with Amlactan at this point.    2. HTN.  Controlled with lisinopril.  Labs next time.    3. COPD.  No flare.  Declines daily inhaler.    Outpatient Medications Prior to Visit   Medication Sig Dispense Refill   • ibuprofen (ADVIL,MOTRIN) 200 MG tablet Take 200 mg by mouth Every 6 (Six) Hours As Needed for Mild Pain .     • lisinopril (PRINIVIL,ZESTRIL) 20 MG tablet Take 1 tablet by mouth Daily. 90 tablet 1   • VENTOLIN  (90 Base) MCG/ACT inhaler Inhale 1 puff As Needed for Wheezing or Shortness of Air. 1 inhaler 2     No facility-administered medications prior to visit.      No orders of the defined types were placed in this encounter.    [unfilled]  There are no discontinued medications.      Return in about 6 months (around 1/18/2020).

## 2019-09-19 DIAGNOSIS — I10 ESSENTIAL HYPERTENSION: ICD-10-CM

## 2019-09-19 RX ORDER — LISINOPRIL 20 MG/1
TABLET ORAL
Qty: 90 TABLET | Refills: 0 | Status: SHIPPED | OUTPATIENT
Start: 2019-09-19 | End: 2019-01-01

## 2020-01-01 ENCOUNTER — APPOINTMENT (OUTPATIENT)
Dept: CT IMAGING | Facility: HOSPITAL | Age: 75
End: 2020-01-01

## 2020-01-01 ENCOUNTER — HOSPITAL ENCOUNTER (OUTPATIENT)
Facility: HOSPITAL | Age: 75
Setting detail: OBSERVATION
End: 2020-11-10
Attending: EMERGENCY MEDICINE | Admitting: HOSPITALIST

## 2020-01-01 ENCOUNTER — TELEPHONE (OUTPATIENT)
Dept: INTERNAL MEDICINE | Facility: CLINIC | Age: 75
End: 2020-01-01

## 2020-01-01 ENCOUNTER — APPOINTMENT (OUTPATIENT)
Dept: GENERAL RADIOLOGY | Facility: HOSPITAL | Age: 75
End: 2020-01-01

## 2020-01-01 ENCOUNTER — HOSPITAL ENCOUNTER (EMERGENCY)
Facility: HOSPITAL | Age: 75
End: 2020-11-11
Attending: EMERGENCY MEDICINE | Admitting: EMERGENCY MEDICINE

## 2020-01-01 ENCOUNTER — OFFICE VISIT (OUTPATIENT)
Dept: INTERNAL MEDICINE | Facility: CLINIC | Age: 75
End: 2020-01-01

## 2020-01-01 VITALS
DIASTOLIC BLOOD PRESSURE: 82 MMHG | TEMPERATURE: 97.3 F | SYSTOLIC BLOOD PRESSURE: 140 MMHG | HEART RATE: 92 BPM | RESPIRATION RATE: 16 BRPM | OXYGEN SATURATION: 99 % | HEIGHT: 66 IN | BODY MASS INDEX: 47.89 KG/M2 | WEIGHT: 298 LBS

## 2020-01-01 DIAGNOSIS — I46.9 CARDIAC ARREST (HCC): Primary | ICD-10-CM

## 2020-01-01 DIAGNOSIS — I26.99 PE (PULMONARY THROMBOEMBOLISM) (HCC): Primary | ICD-10-CM

## 2020-01-01 DIAGNOSIS — L03.115 CELLULITIS OF RIGHT LOWER EXTREMITY: ICD-10-CM

## 2020-01-01 DIAGNOSIS — I10 ESSENTIAL HYPERTENSION: ICD-10-CM

## 2020-01-01 DIAGNOSIS — K92.2 GASTROINTESTINAL HEMORRHAGE, UNSPECIFIED GASTROINTESTINAL HEMORRHAGE TYPE: ICD-10-CM

## 2020-01-01 DIAGNOSIS — R60.0 PEDAL EDEMA: Primary | ICD-10-CM

## 2020-01-01 DIAGNOSIS — M70.61 TROCHANTERIC BURSITIS OF RIGHT HIP: ICD-10-CM

## 2020-01-01 DIAGNOSIS — R06.00 DYSPNEA, UNSPECIFIED TYPE: ICD-10-CM

## 2020-01-01 DIAGNOSIS — L03.115 CELLULITIS OF RIGHT LEG: ICD-10-CM

## 2020-01-01 LAB
ADV 40+41 DNA STL QL NAA+NON-PROBE: NOT DETECTED
ALBUMIN SERPL-MCNC: 3 G/DL (ref 3.5–5.2)
ALBUMIN SERPL-MCNC: 4 G/DL (ref 3.7–4.7)
ALBUMIN/GLOB SERPL: 1.1 G/DL
ALBUMIN/GLOB SERPL: 1.3 {RATIO} (ref 1.2–2.2)
ALP SERPL-CCNC: 120 U/L (ref 39–117)
ALP SERPL-CCNC: 53 IU/L (ref 39–117)
ALT SERPL W P-5'-P-CCNC: 165 U/L (ref 1–41)
ALT SERPL-CCNC: 24 IU/L (ref 0–44)
ANION GAP SERPL CALCULATED.3IONS-SCNC: 11.9 MMOL/L (ref 5–15)
ANION GAP SERPL CALCULATED.3IONS-SCNC: 12.2 MMOL/L (ref 5–15)
APTT PPP: 31.7 SECONDS (ref 24.3–38.1)
AST SERPL-CCNC: 110 U/L (ref 1–40)
AST SERPL-CCNC: 19 IU/L (ref 0–40)
ASTRO TYP 1-8 RNA STL QL NAA+NON-PROBE: NOT DETECTED
BACTERIA SPEC AEROBE CULT: ABNORMAL
BACTERIA SPEC ANAEROBE CULT: ABNORMAL
BACTERIA SPEC CULT: ABNORMAL
BASOPHILS # BLD AUTO: 0.02 10*3/MM3 (ref 0–0.2)
BASOPHILS # BLD AUTO: 0.02 10*3/MM3 (ref 0–0.2)
BASOPHILS # BLD AUTO: 0.1 X10E3/UL (ref 0–0.2)
BASOPHILS NFR BLD AUTO: 0.2 % (ref 0–1.5)
BASOPHILS NFR BLD AUTO: 0.3 % (ref 0–1.5)
BASOPHILS NFR BLD AUTO: 1 %
BILIRUB SERPL-MCNC: 0.2 MG/DL (ref 0–1.2)
BILIRUB SERPL-MCNC: 0.3 MG/DL (ref 0–1.2)
BILIRUB UR QL STRIP: NEGATIVE
BLASTS NFR BLD MANUAL: 0 % (ref 0–0)
BNP SERPL-MCNC: 19.6 PG/ML (ref 0–100)
BUN SERPL-MCNC: 21 MG/DL (ref 8–27)
BUN SERPL-MCNC: 51 MG/DL (ref 8–23)
BUN SERPL-MCNC: 53 MG/DL (ref 8–23)
BUN/CREAT SERPL: 21 (ref 10–24)
BUN/CREAT SERPL: 31.7 (ref 7–25)
BUN/CREAT SERPL: 32.3 (ref 7–25)
C CAYETANENSIS DNA STL QL NAA+NON-PROBE: NOT DETECTED
CALCIUM SERPL-MCNC: 9.3 MG/DL (ref 8.6–10.2)
CALCIUM SPEC-SCNC: 8.2 MG/DL (ref 8.6–10.5)
CALCIUM SPEC-SCNC: 8.5 MG/DL (ref 8.6–10.5)
CAMPY SP DNA.DIARRHEA STL QL NAA+PROBE: NOT DETECTED
CHLORIDE SERPL-SCNC: 101 MMOL/L (ref 98–107)
CHLORIDE SERPL-SCNC: 101 MMOL/L (ref 98–107)
CHLORIDE SERPL-SCNC: 106 MMOL/L (ref 96–106)
CHOLEST SERPL-MCNC: 180 MG/DL (ref 100–199)
CHOLEST/HDLC SERPL: 4.6 RATIO (ref 0–5)
CLARITY UR: CLEAR
CO2 SERPL-SCNC: 21.8 MMOL/L (ref 22–29)
CO2 SERPL-SCNC: 22.1 MMOL/L (ref 22–29)
CO2 SERPL-SCNC: 24 MMOL/L (ref 20–29)
COLOR UR: YELLOW
CREAT SERPL-MCNC: 1.02 MG/DL (ref 0.76–1.27)
CREAT SERPL-MCNC: 1.61 MG/DL (ref 0.76–1.27)
CREAT SERPL-MCNC: 1.64 MG/DL (ref 0.76–1.27)
CRYPTOSP STL CULT: NOT DETECTED
D DIMER PPP FEU-MCNC: 3.82 MCGFEU/ML (ref 0–0.46)
D-LACTATE SERPL-SCNC: 2.9 MMOL/L (ref 0.5–2)
D-LACTATE SERPL-SCNC: 3 MMOL/L (ref 0.5–2)
D-LACTATE SERPL-SCNC: 3.5 MMOL/L (ref 0.5–2)
DEPRECATED RDW RBC AUTO: 47.9 FL (ref 37–54)
DEPRECATED RDW RBC AUTO: 51.3 FL (ref 37–54)
E COLI DNA SPEC QL NAA+PROBE: NOT DETECTED
E HISTOLYT AG STL-ACNC: NOT DETECTED
EAEC PAA PLAS AGGR+AATA ST NAA+NON-PRB: NOT DETECTED
EC STX1 + STX2 GENES STL NAA+PROBE: NOT DETECTED
EOSINOPHIL # BLD AUTO: 0.14 10*3/MM3 (ref 0–0.4)
EOSINOPHIL # BLD AUTO: 0.14 10*3/MM3 (ref 0–0.4)
EOSINOPHIL # BLD AUTO: 0.3 X10E3/UL (ref 0–0.4)
EOSINOPHIL NFR BLD AUTO: 1.4 % (ref 0.3–6.2)
EOSINOPHIL NFR BLD AUTO: 2 % (ref 0.3–6.2)
EOSINOPHIL NFR BLD AUTO: 3 %
EPEC EAE GENE STL QL NAA+NON-PROBE: NOT DETECTED
ERYTHROCYTE [DISTWIDTH] IN BLOOD BY AUTOMATED COUNT: 12.9 % (ref 11.6–15.4)
ERYTHROCYTE [DISTWIDTH] IN BLOOD BY AUTOMATED COUNT: 13.6 % (ref 12.3–15.4)
ERYTHROCYTE [DISTWIDTH] IN BLOOD BY AUTOMATED COUNT: 13.9 % (ref 12.3–15.4)
ETEC LTA+ST1A+ST1B TOX ST NAA+NON-PROBE: NOT DETECTED
FERRITIN SERPL-MCNC: 963 NG/ML (ref 30–400)
FLUAV AG NPH QL: NEGATIVE
FLUBV AG NPH QL IA: NEGATIVE
G LAMBLIA DNA SPEC QL NAA+PROBE: NOT DETECTED
GFR SERPL CREATININE-BSD FRML MDRD: 41 ML/MIN/1.73
GFR SERPL CREATININE-BSD FRML MDRD: 42 ML/MIN/1.73
GLOBULIN SER CALC-MCNC: 3.1 G/DL (ref 1.5–4.5)
GLOBULIN UR ELPH-MCNC: 2.8 GM/DL
GLUCOSE SERPL-MCNC: 126 MG/DL (ref 65–99)
GLUCOSE SERPL-MCNC: 153 MG/DL (ref 65–99)
GLUCOSE SERPL-MCNC: 85 MG/DL (ref 65–99)
GLUCOSE UR STRIP-MCNC: NEGATIVE MG/DL
HCT VFR BLD AUTO: 28.3 % (ref 37.5–51)
HCT VFR BLD AUTO: 33.7 % (ref 37.5–51)
HCT VFR BLD AUTO: 42.2 % (ref 37.5–51)
HDLC SERPL-MCNC: 39 MG/DL
HGB BLD-MCNC: 10.7 G/DL (ref 13–17.7)
HGB BLD-MCNC: 14.4 G/DL (ref 13–17.7)
HGB BLD-MCNC: 8.5 G/DL (ref 13–17.7)
HGB UR QL STRIP.AUTO: NEGATIVE
IMM GRANULOCYTES # BLD AUTO: 0 X10E3/UL (ref 0–0.1)
IMM GRANULOCYTES # BLD AUTO: 0.04 10*3/MM3 (ref 0–0.05)
IMM GRANULOCYTES # BLD AUTO: 0.07 10*3/MM3 (ref 0–0.05)
IMM GRANULOCYTES NFR BLD AUTO: 0 %
IMM GRANULOCYTES NFR BLD AUTO: 0.6 % (ref 0–0.5)
IMM GRANULOCYTES NFR BLD AUTO: 0.7 % (ref 0–0.5)
INR PPP: 1.26 (ref 0.9–1.1)
KETONES UR QL STRIP: NEGATIVE
LACTATE HOLD SPECIMEN: NORMAL
LDH SERPL-CCNC: 272 U/L (ref 135–225)
LDLC SERPL CALC-MCNC: 121 MG/DL (ref 0–99)
LEUKOCYTE ESTERASE UR QL STRIP.AUTO: NEGATIVE
LYMPHOCYTES # BLD AUTO: 0.9 10*3/MM3 (ref 0.7–3.1)
LYMPHOCYTES # BLD AUTO: 1.64 10*3/MM3 (ref 0.7–3.1)
LYMPHOCYTES # BLD AUTO: 1.8 X10E3/UL (ref 0.7–3.1)
LYMPHOCYTES # BLD MANUAL: 0.56 10*3/MM3 (ref 0.7–3.1)
LYMPHOCYTES NFR BLD AUTO: 12.9 % (ref 19.6–45.3)
LYMPHOCYTES NFR BLD AUTO: 16.5 % (ref 19.6–45.3)
LYMPHOCYTES NFR BLD AUTO: 21 %
LYMPHOCYTES NFR BLD MANUAL: 2 % (ref 5–12)
LYMPHOCYTES NFR BLD MANUAL: 8 % (ref 19.6–45.3)
MAGNESIUM SERPL-MCNC: 2.5 MG/DL (ref 1.6–2.4)
MCH RBC QN AUTO: 29.8 PG (ref 26.6–33)
MCH RBC QN AUTO: 30.1 PG (ref 26.6–33)
MCH RBC QN AUTO: 31.3 PG (ref 26.6–33)
MCHC RBC AUTO-ENTMCNC: 30 G/DL (ref 31.5–35.7)
MCHC RBC AUTO-ENTMCNC: 31.8 G/DL (ref 31.5–35.7)
MCHC RBC AUTO-ENTMCNC: 34.1 G/DL (ref 31.5–35.7)
MCV RBC AUTO: 92 FL (ref 79–97)
MCV RBC AUTO: 94.9 FL (ref 79–97)
MCV RBC AUTO: 99.3 FL (ref 79–97)
METAMYELOCYTES NFR BLD MANUAL: 0 % (ref 0–0)
MONOCYTES # BLD AUTO: 0.14 10*3/MM3 (ref 0.1–0.9)
MONOCYTES # BLD AUTO: 0.35 10*3/MM3 (ref 0.1–0.9)
MONOCYTES # BLD AUTO: 0.57 10*3/MM3 (ref 0.1–0.9)
MONOCYTES # BLD AUTO: 0.8 X10E3/UL (ref 0.1–0.9)
MONOCYTES NFR BLD AUTO: 10 %
MONOCYTES NFR BLD AUTO: 5 % (ref 5–12)
MONOCYTES NFR BLD AUTO: 5.7 % (ref 5–12)
MYELOCYTES NFR BLD MANUAL: 0 % (ref 0–0)
NEUTROPHILS # BLD AUTO: 5.6 X10E3/UL (ref 1.4–7)
NEUTROPHILS # BLD AUTO: 5.79 10*3/MM3 (ref 1.7–7)
NEUTROPHILS NFR BLD AUTO: 5.53 10*3/MM3 (ref 1.7–7)
NEUTROPHILS NFR BLD AUTO: 65 %
NEUTROPHILS NFR BLD AUTO: 7.51 10*3/MM3 (ref 1.7–7)
NEUTROPHILS NFR BLD AUTO: 75.5 % (ref 42.7–76)
NEUTROPHILS NFR BLD AUTO: 79.2 % (ref 42.7–76)
NEUTROPHILS NFR BLD MANUAL: 83 % (ref 42.7–76)
NEUTS BAND NFR BLD MANUAL: 0 % (ref 0–5)
NITRITE UR QL STRIP: NEGATIVE
NOROVIRUS GI+II RNA STL QL NAA+NON-PROBE: NOT DETECTED
NRBC BLD AUTO-RTO: 0 /100 WBC (ref 0–0.2)
NRBC BLD AUTO-RTO: 0 /100 WBC (ref 0–0.2)
NT-PROBNP SERPL-MCNC: 470 PG/ML (ref 0–1800)
OTHER ANTIBIOTIC SUSC ISLT: ABNORMAL
OTHER CELLS %: 0 % (ref 0–0)
P SHIGELLOIDES DNA STL QL NAA+PROBE: NOT DETECTED
PH UR STRIP.AUTO: <=5 [PH] (ref 4.5–8)
PHOSPHATE SERPL-MCNC: 3.7 MG/DL (ref 2.5–4.5)
PLASMA CELL PREC NFR BLD MANUAL: 0 % (ref 0–0)
PLAT MORPH BLD: NORMAL
PLATELET # BLD AUTO: 128 10*3/MM3 (ref 140–450)
PLATELET # BLD AUTO: 148 10*3/MM3 (ref 140–450)
PLATELET # BLD AUTO: 185 X10E3/UL (ref 150–450)
PMV BLD AUTO: 12 FL (ref 6–12)
PMV BLD AUTO: 12.5 FL (ref 6–12)
POTASSIUM SERPL-SCNC: 4.5 MMOL/L (ref 3.5–5.2)
POTASSIUM SERPL-SCNC: 4.7 MMOL/L (ref 3.5–5.2)
POTASSIUM SERPL-SCNC: 4.9 MMOL/L (ref 3.5–5.2)
PROCALCITONIN SERPL-MCNC: 0.53 NG/ML (ref 0–0.25)
PROLYMPHOCYTES NFR BLD MANUAL: 0 % (ref 0–0)
PROMYELOCYTES NFR BLD MANUAL: 0 % (ref 0–0)
PROT SERPL-MCNC: 5.8 G/DL (ref 6–8.5)
PROT SERPL-MCNC: 7.1 G/DL (ref 6–8.5)
PROT UR QL STRIP: NEGATIVE
PROTHROMBIN TIME: 15.4 SECONDS (ref 12.1–15)
QT INTERVAL: 324 MS
RBC # BLD AUTO: 2.85 10*6/MM3 (ref 4.14–5.8)
RBC # BLD AUTO: 3.55 10*6/MM3 (ref 4.14–5.8)
RBC # BLD AUTO: 4.6 X10E6/UL (ref 4.14–5.8)
RBC MORPH BLD: NORMAL
RV RNA STL NAA+PROBE: NOT DETECTED
SALMONELLA DNA SPEC QL NAA+PROBE: NOT DETECTED
SAPO I+II+IV+V RNA STL QL NAA+NON-PROBE: NOT DETECTED
SARS-COV-2 RNA PNL SPEC NAA+PROBE: NOT DETECTED
SHIGELLA SP+EIEC IPAH STL QL NAA+PROBE: NOT DETECTED
SODIUM SERPL-SCNC: 135 MMOL/L (ref 136–145)
SODIUM SERPL-SCNC: 135 MMOL/L (ref 136–145)
SODIUM SERPL-SCNC: 142 MMOL/L (ref 134–144)
SODIUM UR-SCNC: 66 MMOL/L
SP GR UR STRIP: 1.01 (ref 1–1.03)
TRIGL SERPL-MCNC: 108 MG/DL (ref 0–149)
TROPONIN T SERPL-MCNC: <0.01 NG/ML (ref 0–0.03)
TSH SERPL DL<=0.005 MIU/L-ACNC: 2.76 UIU/ML (ref 0.45–4.5)
UROBILINOGEN UR QL STRIP: NORMAL
V CHOLERAE DNA SPEC QL NAA+PROBE: NOT DETECTED
VARIANT LYMPHS NFR BLD MANUAL: 7 % (ref 0–5)
VIBRIO DNA SPEC NAA+PROBE: NOT DETECTED
VLDLC SERPL CALC-MCNC: 20 MG/DL (ref 5–40)
WBC # BLD AUTO: 6.98 10*3/MM3 (ref 3.4–10.8)
WBC # BLD AUTO: 8.5 X10E3/UL (ref 3.4–10.8)
WBC # BLD AUTO: 9.95 10*3/MM3 (ref 3.4–10.8)
WBC MORPH BLD: NORMAL
YERSINIA STL CULT: NOT DETECTED

## 2020-01-01 PROCEDURE — 83605 ASSAY OF LACTIC ACID: CPT | Performed by: HOSPITALIST

## 2020-01-01 PROCEDURE — 82570 ASSAY OF URINE CREATININE: CPT | Performed by: INTERNAL MEDICINE

## 2020-01-01 PROCEDURE — 73502 X-RAY EXAM HIP UNI 2-3 VIEWS: CPT

## 2020-01-01 PROCEDURE — 83615 LACTATE (LD) (LDH) ENZYME: CPT | Performed by: HOSPITALIST

## 2020-01-01 PROCEDURE — 85007 BL SMEAR W/DIFF WBC COUNT: CPT | Performed by: EMERGENCY MEDICINE

## 2020-01-01 PROCEDURE — 99284 EMERGENCY DEPT VISIT MOD MDM: CPT

## 2020-01-01 PROCEDURE — G0378 HOSPITAL OBSERVATION PER HR: HCPCS

## 2020-01-01 PROCEDURE — 84484 ASSAY OF TROPONIN QUANT: CPT | Performed by: EMERGENCY MEDICINE

## 2020-01-01 PROCEDURE — 25010000002 CEFTRIAXONE SODIUM-DEXTROSE 1-3.74 GM-%(50ML) RECONSTITUTED SOLUTION: Performed by: EMERGENCY MEDICINE

## 2020-01-01 PROCEDURE — 84100 ASSAY OF PHOSPHORUS: CPT | Performed by: HOSPITALIST

## 2020-01-01 PROCEDURE — 84145 PROCALCITONIN (PCT): CPT | Performed by: EMERGENCY MEDICINE

## 2020-01-01 PROCEDURE — 99285 EMERGENCY DEPT VISIT HI MDM: CPT | Performed by: EMERGENCY MEDICINE

## 2020-01-01 PROCEDURE — 80053 COMPREHEN METABOLIC PANEL: CPT | Performed by: EMERGENCY MEDICINE

## 2020-01-01 PROCEDURE — 83605 ASSAY OF LACTIC ACID: CPT | Performed by: EMERGENCY MEDICINE

## 2020-01-01 PROCEDURE — 99236 HOSP IP/OBS SAME DATE HI 85: CPT | Performed by: HOSPITALIST

## 2020-01-01 PROCEDURE — 93005 ELECTROCARDIOGRAM TRACING: CPT | Performed by: EMERGENCY MEDICINE

## 2020-01-01 PROCEDURE — 84300 ASSAY OF URINE SODIUM: CPT | Performed by: INTERNAL MEDICINE

## 2020-01-01 PROCEDURE — 85025 COMPLETE CBC W/AUTO DIFF WBC: CPT | Performed by: EMERGENCY MEDICINE

## 2020-01-01 PROCEDURE — 74176 CT ABD & PELVIS W/O CONTRAST: CPT

## 2020-01-01 PROCEDURE — 93010 ELECTROCARDIOGRAM REPORT: CPT | Performed by: INTERNAL MEDICINE

## 2020-01-01 PROCEDURE — 96372 THER/PROPH/DIAG INJ SC/IM: CPT

## 2020-01-01 PROCEDURE — 71275 CT ANGIOGRAPHY CHEST: CPT

## 2020-01-01 PROCEDURE — 36415 COLL VENOUS BLD VENIPUNCTURE: CPT

## 2020-01-01 PROCEDURE — 83735 ASSAY OF MAGNESIUM: CPT | Performed by: HOSPITALIST

## 2020-01-01 PROCEDURE — 87635 SARS-COV-2 COVID-19 AMP PRB: CPT | Performed by: EMERGENCY MEDICINE

## 2020-01-01 PROCEDURE — 74019 RADEX ABDOMEN 2 VIEWS: CPT

## 2020-01-01 PROCEDURE — 0 DIATRIZOATE MEGLUMINE & SODIUM PER 1 ML: Performed by: HOSPITALIST

## 2020-01-01 PROCEDURE — 25010000002 VANCOMYCIN 1 G RECONSTITUTED SOLUTION 1 EACH VIAL: Performed by: HOSPITALIST

## 2020-01-01 PROCEDURE — 99214 OFFICE O/P EST MOD 30 MIN: CPT | Performed by: FAMILY MEDICINE

## 2020-01-01 PROCEDURE — 25010000002 ENOXAPARIN PER 10 MG: Performed by: EMERGENCY MEDICINE

## 2020-01-01 PROCEDURE — 85025 COMPLETE CBC W/AUTO DIFF WBC: CPT | Performed by: HOSPITALIST

## 2020-01-01 PROCEDURE — 25010000002 ONDANSETRON PER 1 MG: Performed by: HOSPITALIST

## 2020-01-01 PROCEDURE — 71045 X-RAY EXAM CHEST 1 VIEW: CPT

## 2020-01-01 PROCEDURE — 87804 INFLUENZA ASSAY W/OPTIC: CPT | Performed by: EMERGENCY MEDICINE

## 2020-01-01 PROCEDURE — 96375 TX/PRO/DX INJ NEW DRUG ADDON: CPT

## 2020-01-01 PROCEDURE — 85610 PROTHROMBIN TIME: CPT | Performed by: EMERGENCY MEDICINE

## 2020-01-01 PROCEDURE — 94799 UNLISTED PULMONARY SVC/PX: CPT

## 2020-01-01 PROCEDURE — 81003 URINALYSIS AUTO W/O SCOPE: CPT | Performed by: EMERGENCY MEDICINE

## 2020-01-01 PROCEDURE — 87040 BLOOD CULTURE FOR BACTERIA: CPT | Performed by: HOSPITALIST

## 2020-01-01 PROCEDURE — C9803 HOPD COVID-19 SPEC COLLECT: HCPCS

## 2020-01-01 PROCEDURE — 85379 FIBRIN DEGRADATION QUANT: CPT | Performed by: EMERGENCY MEDICINE

## 2020-01-01 PROCEDURE — 25010000002 EPINEPHRINE 1 MG/10ML SOLUTION PREFILLED SYRINGE: Performed by: EMERGENCY MEDICINE

## 2020-01-01 PROCEDURE — 87040 BLOOD CULTURE FOR BACTERIA: CPT | Performed by: EMERGENCY MEDICINE

## 2020-01-01 PROCEDURE — 85730 THROMBOPLASTIN TIME PARTIAL: CPT | Performed by: EMERGENCY MEDICINE

## 2020-01-01 PROCEDURE — 96365 THER/PROPH/DIAG IV INF INIT: CPT

## 2020-01-01 PROCEDURE — 0097U HC BIOFIRE FILMARRAY GI PANEL: CPT | Performed by: HOSPITALIST

## 2020-01-01 PROCEDURE — 83880 ASSAY OF NATRIURETIC PEPTIDE: CPT | Performed by: EMERGENCY MEDICINE

## 2020-01-01 PROCEDURE — 92950 HEART/LUNG RESUSCITATION CPR: CPT

## 2020-01-01 PROCEDURE — 82728 ASSAY OF FERRITIN: CPT | Performed by: HOSPITALIST

## 2020-01-01 PROCEDURE — 0 IOPAMIDOL PER 1 ML: Performed by: EMERGENCY MEDICINE

## 2020-01-01 RX ORDER — CEFEPIME HYDROCHLORIDE 2 G/50ML
2 INJECTION, SOLUTION INTRAVENOUS EVERY 8 HOURS
Status: CANCELLED | OUTPATIENT
Start: 2020-11-11 | End: 2020-11-21

## 2020-01-01 RX ORDER — SODIUM CHLORIDE 0.9 % (FLUSH) 0.9 %
10 SYRINGE (ML) INJECTION EVERY 12 HOURS SCHEDULED
Status: DISCONTINUED | OUTPATIENT
Start: 2020-01-01 | End: 2020-01-01 | Stop reason: HOSPADM

## 2020-01-01 RX ORDER — CEFEPIME HYDROCHLORIDE 2 G/50ML
2 INJECTION, SOLUTION INTRAVENOUS EVERY 8 HOURS
Status: DISCONTINUED | OUTPATIENT
Start: 2020-11-11 | End: 2020-01-01 | Stop reason: HOSPADM

## 2020-01-01 RX ORDER — LISINOPRIL 20 MG/1
TABLET ORAL
Qty: 30 TABLET | Refills: 0 | Status: SHIPPED | OUTPATIENT
Start: 2020-01-01 | End: 2020-01-01 | Stop reason: SDUPTHER

## 2020-01-01 RX ORDER — ONDANSETRON 2 MG/ML
INJECTION INTRAMUSCULAR; INTRAVENOUS
Status: DISCONTINUED
Start: 2020-01-01 | End: 2020-01-01 | Stop reason: HOSPADM

## 2020-01-01 RX ORDER — FUROSEMIDE 20 MG/1
40 TABLET ORAL DAILY
Qty: 45 TABLET | Refills: 0 | Status: SHIPPED | OUTPATIENT
Start: 2020-01-01

## 2020-01-01 RX ORDER — LISINOPRIL 20 MG/1
TABLET ORAL
Qty: 90 TABLET | Refills: 0 | Status: SHIPPED | OUTPATIENT
Start: 2020-01-01 | End: 2020-01-01

## 2020-01-01 RX ORDER — CEFTRIAXONE 1 G/50ML
1 INJECTION, SOLUTION INTRAVENOUS ONCE
Status: COMPLETED | OUTPATIENT
Start: 2020-01-01 | End: 2020-01-01

## 2020-01-01 RX ORDER — LISINOPRIL 20 MG/1
TABLET ORAL
Qty: 30 TABLET | Refills: 0 | Status: SHIPPED | OUTPATIENT
Start: 2020-01-01 | End: 2020-01-01

## 2020-01-01 RX ORDER — SODIUM CHLORIDE 9 MG/ML
125 INJECTION, SOLUTION INTRAVENOUS CONTINUOUS
Status: DISCONTINUED | OUTPATIENT
Start: 2020-01-01 | End: 2020-01-01 | Stop reason: HOSPADM

## 2020-01-01 RX ORDER — SULFAMETHOXAZOLE AND TRIMETHOPRIM 800; 160 MG/1; MG/1
1 TABLET ORAL 2 TIMES DAILY
Qty: 20 TABLET | Refills: 0 | Status: ON HOLD | OUTPATIENT
Start: 2020-01-01 | End: 2020-01-01

## 2020-01-01 RX ORDER — LISINOPRIL 20 MG/1
20 TABLET ORAL DAILY
Qty: 30 TABLET | Refills: 5 | Status: SHIPPED | OUTPATIENT
Start: 2020-01-01

## 2020-01-01 RX ORDER — SODIUM CHLORIDE 0.9 % (FLUSH) 0.9 %
10 SYRINGE (ML) INJECTION EVERY 12 HOURS SCHEDULED
Status: CANCELLED | OUTPATIENT
Start: 2020-01-01

## 2020-01-01 RX ORDER — EPINEPHRINE 0.1 MG/ML
SYRINGE (ML) INJECTION
Status: COMPLETED | OUTPATIENT
Start: 2020-01-01 | End: 2020-01-01

## 2020-01-01 RX ORDER — ALBUTEROL SULFATE 2.5 MG/3ML
2.5 SOLUTION RESPIRATORY (INHALATION) EVERY 6 HOURS PRN
Status: CANCELLED | OUTPATIENT
Start: 2020-01-01

## 2020-01-01 RX ORDER — ONDANSETRON 2 MG/ML
4 INJECTION INTRAMUSCULAR; INTRAVENOUS EVERY 6 HOURS PRN
Status: DISCONTINUED | OUTPATIENT
Start: 2020-01-01 | End: 2020-01-01 | Stop reason: HOSPADM

## 2020-01-01 RX ORDER — SODIUM CHLORIDE 9 MG/ML
40 INJECTION, SOLUTION INTRAVENOUS AS NEEDED
Status: DISCONTINUED | OUTPATIENT
Start: 2020-01-01 | End: 2020-01-01 | Stop reason: HOSPADM

## 2020-01-01 RX ORDER — POTASSIUM CHLORIDE 750 MG/1
20 TABLET, FILM COATED, EXTENDED RELEASE ORAL DAILY
Qty: 45 TABLET | Refills: 0 | Status: SHIPPED | OUTPATIENT
Start: 2020-01-01

## 2020-01-01 RX ORDER — ALBUTEROL SULFATE 2.5 MG/3ML
2.5 SOLUTION RESPIRATORY (INHALATION) EVERY 6 HOURS PRN
Status: DISCONTINUED | OUTPATIENT
Start: 2020-01-01 | End: 2020-01-01 | Stop reason: HOSPADM

## 2020-01-01 RX ORDER — SODIUM CHLORIDE 0.9 % (FLUSH) 0.9 %
10 SYRINGE (ML) INJECTION AS NEEDED
Status: DISCONTINUED | OUTPATIENT
Start: 2020-01-01 | End: 2020-01-01 | Stop reason: HOSPADM

## 2020-01-01 RX ORDER — CEFEPIME HYDROCHLORIDE 2 G/50ML
2 INJECTION, SOLUTION INTRAVENOUS ONCE
Status: DISCONTINUED | OUTPATIENT
Start: 2020-01-01 | End: 2020-01-01 | Stop reason: HOSPADM

## 2020-01-01 RX ORDER — SODIUM CHLORIDE 9 MG/ML
40 INJECTION, SOLUTION INTRAVENOUS AS NEEDED
Status: CANCELLED | OUTPATIENT
Start: 2020-01-01

## 2020-01-01 RX ORDER — SODIUM CHLORIDE 0.9 % (FLUSH) 0.9 %
10 SYRINGE (ML) INJECTION AS NEEDED
Status: CANCELLED | OUTPATIENT
Start: 2020-01-01

## 2020-01-01 RX ORDER — LISINOPRIL 20 MG/1
TABLET ORAL
Qty: 90 TABLET | Refills: 0 | OUTPATIENT
Start: 2020-01-01

## 2020-01-01 RX ORDER — SODIUM CHLORIDE 9 MG/ML
125 INJECTION, SOLUTION INTRAVENOUS CONTINUOUS
Status: CANCELLED | OUTPATIENT
Start: 2020-01-01

## 2020-01-01 RX ADMIN — ONDANSETRON 4 MG: 2 INJECTION, SOLUTION INTRAMUSCULAR; INTRAVENOUS at 20:41

## 2020-01-01 RX ADMIN — EPINEPHRINE 1 MG: 0.1 INJECTION, SOLUTION ENDOTRACHEAL; INTRACARDIAC; INTRAVENOUS at 22:03

## 2020-01-01 RX ADMIN — EPINEPHRINE 1 MG: 0.1 INJECTION, SOLUTION ENDOTRACHEAL; INTRACARDIAC; INTRAVENOUS at 22:00

## 2020-01-01 RX ADMIN — SODIUM CHLORIDE 8 MG/HR: 900 INJECTION INTRAVENOUS at 18:15

## 2020-01-01 RX ADMIN — VANCOMYCIN HYDROCHLORIDE 2000 MG: 1 INJECTION, POWDER, LYOPHILIZED, FOR SOLUTION INTRAVENOUS at 18:15

## 2020-01-01 RX ADMIN — EPINEPHRINE 1 MG: 0.1 INJECTION, SOLUTION ENDOTRACHEAL; INTRACARDIAC; INTRAVENOUS at 22:07

## 2020-01-01 RX ADMIN — CEFTRIAXONE 1 G: 1 INJECTION, SOLUTION INTRAVENOUS at 13:02

## 2020-01-01 RX ADMIN — SODIUM CHLORIDE 125 ML/HR: 9 INJECTION, SOLUTION INTRAVENOUS at 11:34

## 2020-01-01 RX ADMIN — IOPAMIDOL 130 ML: 755 INJECTION, SOLUTION INTRAVENOUS at 12:40

## 2020-01-01 RX ADMIN — ENOXAPARIN SODIUM 140 MG: 150 INJECTION SUBCUTANEOUS at 14:01

## 2020-01-01 RX ADMIN — DIATRIZOATE MEGLUMINE AND DIATRIZOATE SODIUM 30 ML: 600; 100 SOLUTION ORAL; RECTAL at 19:45

## 2020-11-04 PROBLEM — L03.115 CELLULITIS OF RIGHT LOWER EXTREMITY: Status: ACTIVE | Noted: 2020-01-01

## 2020-11-04 PROBLEM — R60.0 PEDAL EDEMA: Status: ACTIVE | Noted: 2020-01-01

## 2020-11-04 PROBLEM — M70.60 TROCHANTERIC BURSITIS: Status: ACTIVE | Noted: 2020-01-01

## 2020-11-04 NOTE — PROGRESS NOTES
Kimberly Wei is a 75 y.o. male, who presents with a chief complaint of   Chief Complaint   Patient presents with   • Edema     bilateral LE   • Itching     right anterior calf   • Hip Pain     right   • Shortness of Breath       HPI     1. Pt presents with the complaint of swelling in his legs.  He says this has been gradually worsening over many months.  The skin itches.  Two weeks ago, he scratched his right shin with his left heel and irritated and opened the skin.  Since then, that are has been oozing brit-colored fluid and has developed surrounding redness.  His wife has been applying ointment and dressings but it seems to be worsening.  He denies fevers.  Denies orthopnea and PND.  Has chronic shortness of breath that he attributes to his COPD.    2. HTN.  Pt takes lisinopril 20 mg daily and is tolerating it.  He hasn't been in for a visit since July of 2018.  His last blood tests were January of 2018.    3. Right hip pain X several months.  On the lateral side of the hip.  It feels stiff when he gets up to walk and then loosens up.    4. COPD.  He has known COPD but has declined inhalers in the past, other than albuterol.  Denies a flare in shortness of breath, wheezing, cough.    The following portions of the patient's history were reviewed and updated as appropriate: allergies, current medications, past family history, past medical history, past social history, past surgical history and problem list.    Allergies: Patient has no known allergies.    Review of Systems   Constitutional: Negative.    Respiratory: Negative.    Cardiovascular: Positive for leg swelling. Negative for chest pain and palpitations.   Gastrointestinal: Negative.    Genitourinary: Negative.    Musculoskeletal: Positive for arthralgias.   Skin: Positive for color change and wound.   Neurological: Negative.    Hematological: Negative.    Psychiatric/Behavioral: Negative.              Wt Readings from Last 3 Encounters:   11/04/20  135 kg (298 lb)   07/18/19 122 kg (269 lb)   01/28/19 130 kg (287 lb)     Temp Readings from Last 3 Encounters:   11/04/20 97.3 °F (36.3 °C) (Temporal)   07/18/19 98.1 °F (36.7 °C) (Oral)   01/28/19 97.7 °F (36.5 °C)     BP Readings from Last 3 Encounters:   11/04/20 140/82   07/18/19 102/78   01/28/19 138/86     Pulse Readings from Last 3 Encounters:   11/04/20 92   07/18/19 107   01/28/19 87     Body mass index is 48.1 kg/m².  @LASTSAO2(3)@    Physical Exam  Vitals signs and nursing note reviewed.   Constitutional:       General: He is not in acute distress.     Appearance: He is obese. He is not ill-appearing.   HENT:      Head: Normocephalic and atraumatic.      Mouth/Throat:      Mouth: Mucous membranes are moist.   Eyes:      Extraocular Movements: Extraocular movements intact.      Conjunctiva/sclera: Conjunctivae normal.   Neck:      Musculoskeletal: Neck supple. No neck rigidity.   Pulmonary:      Effort: Pulmonary effort is normal. No respiratory distress.   Musculoskeletal:      Right lower leg: Edema (2+ to shin) present.      Left lower leg: Edema (1+ to shin) present.   Skin:            Comments: Area of erythema, warmth, oozing right lateral shin.   Neurological:      General: No focal deficit present.      Mental Status: He is alert and oriented to person, place, and time.   Psychiatric:         Mood and Affect: Mood normal.         Behavior: Behavior normal.         Results for orders placed or performed in visit on 01/28/19   Comprehensive Metabolic Panel    Specimen: Blood   Result Value Ref Range    Glucose 90 65 - 99 mg/dL    BUN 19 8 - 23 mg/dL    Creatinine 1.27 0.76 - 1.27 mg/dL    eGFR Non African Am 55 (L) >60 mL/min/1.73    eGFR African Am 67 >60 mL/min/1.73    BUN/Creatinine Ratio 15.0 7.0 - 25.0    Sodium 144 136 - 145 mmol/L    Potassium 4.6 3.5 - 5.2 mmol/L    Chloride 105 98 - 107 mmol/L    Total CO2 27.7 22.0 - 29.0 mmol/L    Calcium 9.2 8.8 - 10.5 mg/dL    Total Protein 7.2 6.0 -  8.5 g/dL    Albumin 4.00 3.50 - 5.20 g/dL    Globulin 3.2 gm/dL    A/G Ratio 1.3 g/dL    Total Bilirubin 0.2 0.2 - 1.2 mg/dL    Alkaline Phosphatase 51 40 - 129 U/L    AST (SGOT) 17 5 - 40 U/L    ALT (SGPT) 23 5 - 41 U/L   Lipid Panel With / Chol / HDL Ratio    Specimen: Blood   Result Value Ref Range    Total Cholesterol 192 0 - 200 mg/dL    Triglycerides 156 (H) 0 - 150 mg/dL    HDL Cholesterol 42 40 - 60 mg/dL    VLDL Cholesterol 31.2 8 - 32 mg/dL    LDL Cholesterol  119 (H) 0 - 100 mg/dL    Chol/HDL Ratio 4.57    TSH    Specimen: Blood   Result Value Ref Range    TSH 2.620 0.270 - 4.200 mIU/mL   T4, Free    Specimen: Blood   Result Value Ref Range    Free T4 0.94 0.93 - 1.70 ng/dL   CBC & Differential    Specimen: Blood   Result Value Ref Range    WBC 8.26 4.80 - 10.80 10*3/mm3    RBC 4.78 4.70 - 6.10 10*6/mm3    Hemoglobin 14.5 14.0 - 18.0 g/dL    Hematocrit 46.1 42.0 - 52.0 %    MCV 96.4 (H) 80.0 - 94.0 fL    MCH 30.3 27.0 - 31.0 pg    MCHC 31.5 31.0 - 37.0 g/dL    RDW 13.1 11.5 - 14.5 %    Platelets 186 140 - 500 10*3/mm3    Neutrophil Rel % 61.2 45.0 - 70.0 %    Lymphocyte Rel % 24.0 20.0 - 45.0 %    Monocyte Rel % 9.8 (H) 3.0 - 8.0 %    Eosinophil Rel % 4.0 0.0 - 4.0 %    Basophil Rel % 0.8 0.0 - 2.0 %    Neutrophils Absolute 5.05 1.50 - 8.30 10*3/mm3    Lymphocytes Absolute 1.98 0.60 - 4.80 10*3/mm3    Monocytes Absolute 0.81 0.00 - 1.00 10*3/mm3    Eosinophils Absolute 0.33 (H) 0.10 - 0.30 10*3/mm3    Basophils Absolute 0.07 0.00 - 0.20 10*3/mm3    Immature Granulocyte Rel % 0.2 0.0 - 0.5 %    Immature Grans Absolute 0.02 0.00 - 0.03 10*3/mm3    nRBC 0.0 0.0 - 0.0 /100 WBC           Diagnoses and all orders for this visit:    1. Pedal edema (Primary)  -     BNP ([facility] Only)  -     furosemide (LASIX) 20 MG tablet; Take 2 tablets by mouth Daily.  Dispense: 45 tablet; Refill: 0  -     potassium chloride 10 MEQ CR tablet; Take 2 tablets by mouth Daily.  Dispense: 45 tablet; Refill: 0    2. Cellulitis of  right lower extremity  -     sulfamethoxazole-trimethoprim (Bactrim DS) 800-160 MG per tablet; Take 1 tablet by mouth 2 (Two) Times a Day.  Dispense: 20 tablet; Refill: 0  -     CBC & Differential  -     Anaerobic & Aerobic Culture ([facility] Only) - Swab, Leg, Right    3. Essential hypertension  -     Comprehensive Metabolic Panel  -     TSH  -     Lipid Panel With / Chol / HDL Ratio  -     lisinopril (PRINIVIL,ZESTRIL) 20 MG tablet; Take 1 tablet by mouth Daily.  Dispense: 30 tablet; Refill: 5    4. Dyspnea, unspecified type   -     BNP ([facility] Only)    5. Trochanteric bursitis of right hip      1. Pedal edema.  Furosemide with potassium replacement X 5 days.  Compression stockings.  Check labs.    2. Cellulitis.  Culture drainage.  Start antibiotics.  Continue dressing changes.  F/U 1 week.  May need wound care referral for Unna boot.     3.  HTN.  Controlled.  Continue lisinopril.  Check labs today.    4. COPD.  No flare.    5. Trochanteric bursitis.  Right.  Consider NSAID vs steroid injection.  Check labs first.    Outpatient Medications Prior to Visit   Medication Sig Dispense Refill   • ibuprofen (ADVIL,MOTRIN) 200 MG tablet Take 200 mg by mouth Every 6 (Six) Hours As Needed for Mild Pain .     • lisinopril (PRINIVIL,ZESTRIL) 20 MG tablet TAKE ONE TABLET BY MOUTH DAILY 30 tablet 0   • VENTOLIN  (90 Base) MCG/ACT inhaler Inhale 1 puff As Needed for Wheezing or Shortness of Air. 1 inhaler 2     No facility-administered medications prior to visit.      New Medications Ordered This Visit   Medications   • sulfamethoxazole-trimethoprim (Bactrim DS) 800-160 MG per tablet     Sig: Take 1 tablet by mouth 2 (Two) Times a Day.     Dispense:  20 tablet     Refill:  0   • furosemide (LASIX) 20 MG tablet     Sig: Take 2 tablets by mouth Daily.     Dispense:  45 tablet     Refill:  0   • potassium chloride 10 MEQ CR tablet     Sig: Take 2 tablets by mouth Daily.     Dispense:  45 tablet     Refill:  0   • lisinopril  (PRINIVIL,ZESTRIL) 20 MG tablet     Sig: Take 1 tablet by mouth Daily.     Dispense:  30 tablet     Refill:  5     Last refill.  Needs appointment and labs.     [unfilled]  Medications Discontinued During This Encounter   Medication Reason   • lisinopril (PRINIVIL,ZESTRIL) 20 MG tablet Reorder         Return in about 1 week (around 11/11/2020).

## 2020-11-06 NOTE — TELEPHONE ENCOUNTER
Spoke with wife and advised ER due to the breathing issue.  Wife states that they are actually already on the way to get Covid testing and that his temp and breathing are better.  I advised to still proceed to ER should his temp and/or SOA increase over the weekend. They are going to quarantine.

## 2020-11-06 NOTE — TELEPHONE ENCOUNTER
PTS WIFE VICKI IS CALLING IN STATING THAT PT HAD A FEVER YESTERDAY .3 AND HAS BEEN HAVING TROUBLE BREATHING(PT HAS COPD BUT HIS ISSUE HAS GOTTEN WORSE).  VICKI WANTS TO KNOW IF PT SHOULD BE TESTED FOR COVID.  VICKI ALSO STATES THAT PT IS ON 3 DIFFERENT MEDICATIONS AND SHE WANTS TO KNOW IF HE SHOULD STOP TAKING THEM UNTIL HE HAS BEEN TESTED.  VICKI IS REQUESTING A CALL BACK     VICKI CALL BACK  [phone]

## 2020-11-10 PROBLEM — I26.99 PE (PULMONARY THROMBOEMBOLISM) (HCC): Status: ACTIVE | Noted: 2020-01-01

## 2020-11-10 NOTE — H&P
[facility] MEDICAL [facility] HOSPITALIST     Gladis Longoria MD    CHIEF COMPLAINT: Fever and SOA    HISTORY OF PRESENT ILLNESS:    Patient has had fever for over a week and diarrhea for many days. His hgb has acutely dropped and he says he has been having diarrhea with black stool.  He is acutelly SOA and weak and loss of appetite.  He has not been drinking enough water per his wife and not eating well.  While I am in the room getting history he is chilling.  I ordered another blood gas. He has had right hip pain for 4 weeks (xray is negative). He has cellulitis in his RLE.  He was on sulfa drug.  Other than the sulfa he has not been on any other antibiotics for the past year.  He has ABLA and SANTO and staph areus from RLE wound, and Pulmonary embolism.  I will put patient in the ICU for close monitoring.            Past Medical History:   Diagnosis Date   • Abnormal thyroid blood test 10/2016   • Anal fissure 03/2017   • Anal fissure    • Chronic fatigue    • Colon polyps    • COPD (chronic obstructive pulmonary disease) (CMS/HCC)    • Depression    • Hemorrhoids 02/23/2017    INT/EXT   • Hypertension    • Proctalgia    • Snoring    • SOB (shortness of breath)      Past Surgical History:   Procedure Laterality Date   • ANAL SPHINCTEROTOMY N/A 05/18/2017    CHEMICAL WITH BOTOX, DR. GEMA MALLOY AT [facility]   • COLONOSCOPY N/A 5/18/2017    5 MM SESSILE TUBULAR ADENOMA POLYP IN TRANSVERSE, 6 MM SESSILE HYPERPLASTIC POLYP IN DESCENDING, ANAL FISSURE, RESCOPE IN 5 YRS, DR. GEMA MALLOY AT [facility]   • HEMORRHOIDECTOMY N/A 8/30/2018    Procedure: INTERNAL/ EXTERNAL HEMORRHOIDECTOMY X2,  INTERNAL HEMORRHOID LIGATION X1, ANAL TAG EXCISION;  Surgeon: Gema Malloy MD;  Location: [facility] OR;  Service: General   • LAPAROSCOPIC CHOLECYSTECTOMY N/A 1990s   • TONSILLECTOMY       Family History   Problem Relation Age of Onset   • Diabetes Father    • Stomach cancer Father    • Alcohol abuse Father    • Throat cancer Brother   "  • Alcohol abuse Brother    • Heart disease Mother    • No Known Problems Sister    • Malig Hyperthermia Neg Hx      Social History     Tobacco Use   • Smoking status: Former Smoker     Types: Cigars     Quit date: 2016     Years since quittin.8   • Smokeless tobacco: Never Used   Substance Use Topics   • Alcohol use: Yes     Alcohol/week: 28.0 standard drinks     Types: 28 Shots of liquor per week     Comment: DAILY   • Drug use: No     Medications Prior to Admission   Medication Sig Dispense Refill Last Dose   • furosemide (LASIX) 20 MG tablet Take 2 tablets by mouth Daily. 45 tablet 0 11/10/2020 at 0800   • ibuprofen (ADVIL,MOTRIN) 200 MG tablet Take 200 mg by mouth Every 6 (Six) Hours As Needed for Mild Pain .   2020 at Unknown time   • lisinopril (PRINIVIL,ZESTRIL) 20 MG tablet Take 1 tablet by mouth Daily. 30 tablet 5 2020 at 2100   • potassium chloride 10 MEQ CR tablet Take 2 tablets by mouth Daily. 45 tablet 0 11/10/2020 at 0800   • sulfamethoxazole-trimethoprim (Bactrim DS) 800-160 MG per tablet Take 1 tablet by mouth 2 (Two) Times a Day. 20 tablet 0 11/10/2020 at 0800   • VENTOLIN  (90 Base) MCG/ACT inhaler Inhale 1 puff As Needed for Wheezing or Shortness of Air. 1 inhaler 2 Unknown at Unknown time     Allergies:  Patient has no known allergies.      There is no immunization history on file for this patient.    REVIEW OF SYSTEMS:  Please see the above history of present illness for pertinent positives and negatives.  The remainder of the patient's systems have been reviewed and are negative.     Vital Signs  Temp:  [97.5 °F (36.4 °C)-98.3 °F (36.8 °C)] 97.5 °F (36.4 °C)  Heart Rate:  [] 78  Resp:  [18-20] 20  BP: ()/(58-75) 130/74  Flowsheet Rows      First Filed Value   Admission Height  165.1 cm (65\") Documented at 11/10/2020 1039   Admission Weight  (!) 139 kg (306 lb 1.6 oz) Documented at 11/10/2020 1039           Body mass index is 49.04 kg/m².    Physical " Exam  Constitutional:       Comments: Patient is hard of hearing.  Patient is having chills and fever.  Blood culture ordered and another H and H  Wife is in room with him.  She is very pleasant and asks good questions.   Cardiovascular:      Rate and Rhythm: Normal rate and regular rhythm.   Pulmonary:      Effort: Pulmonary effort is normal.      Breath sounds: Normal breath sounds.      Comments: Shallow respirations but large abdomen  Abdominal:      Comments: Rotund and tympanitic to palpation and tapping.  No bowel sounds right now.   Skin:     General: Skin is warm and dry.   Neurological:      General: No focal deficit present.      Mental Status: He is alert and oriented to person, place, and time.   Psychiatric:         Mood and Affect: Mood normal.         Behavior: Behavior normal.         Thought Content: Thought content normal.         Judgment: Judgment normal.            Results Review:    I reviewed the patient's new clinical results.  Lab Results (most recent)     Procedure Component Value Units Date/Time    Lactate Dehydrogenase [[id number]]  (Abnormal) Collected: 11/10/20 1541    Specimen: Blood Updated: 11/10/20 1602      U/L     Ferritin [[id number]]  (Abnormal) Collected: 11/10/20 1143    Specimen: Blood Updated: 11/10/20 1533     Ferritin 963.00 ng/mL     Narrative:      Results may be falsely decreased if patient taking Biotin.      Lactic Acid, Reflex [[id number]]  (Abnormal) Collected: 11/10/20 1502    Specimen: Blood Updated: 11/10/20 1518     Lactate 2.9 mmol/L     Lactic Acid, Reflex Timer (This will reflex a repeat order 3-3:15 hours after ordered.) [[id number]] Collected: 11/10/20 1100    Specimen: Blood Updated: 11/10/20 1445     Hold Tube Hold for add-ons.     Comment: Auto resulted.       Urinalysis With Culture If Indicated - Urine, Clean Catch [[id number]]  (Normal) Collected: 11/10/20 1356    Specimen: Urine, Clean Catch Updated: 11/10/20 1409     Color, UA Yellow      Appearance, UA Clear     pH, UA <=5.0     Specific Gravity, UA 1.015     Glucose, UA Negative     Ketones, UA Negative     Bilirubin, UA Negative     Blood, UA Negative     Protein, UA Negative     Leuk Esterase, UA Negative     Nitrite, UA Negative     Urobilinogen, UA 0.2 E.U./dL    Narrative:      Urine microscopic not indicated.    Troponin [[id number]]  (Normal) Collected: 11/10/20 1143    Specimen: Blood Updated: 11/10/20 1339     Troponin T <0.010 ng/mL     Narrative:      Troponin T Reference Range:  <= 0.03 ng/mL-   Negative for AMI  >0.03 ng/mL-     Abnormal for myocardial necrosis.  Clinicians would have to utilize clinical acumen, EKG, Troponin and serial changes to determine if it is an Acute Myocardial Infarction or myocardial injury due to an underlying chronic condition.       Results may be falsely decreased if patient taking Biotin.      aPTT [[id number]]  (Normal) Collected: 11/10/20 1100    Specimen: Blood Updated: 11/10/20 1329     PTT 31.7 seconds     Narrative:      PTT = The equivalent PTT values for the therapeutic range of heparin levels at 0.1 to 0.7 U/ml are 53 to 110 seconds.      Protime-INR [[id number]]  (Abnormal) Collected: 11/10/20 1100    Specimen: Blood Updated: 11/10/20 1328     Protime 15.4 Seconds      INR 1.26    Narrative:      Therapeutic Ranges for INR: 2.0-3.0 (PT 20-30)                              2.5-3.5 (PT 25-34)    Blood Culture - Blood, Arm, Left [[id number]] Collected: 11/10/20 1228    Specimen: Blood from Arm, Left Updated: 11/10/20 1231    Procalcitonin [[id number]]  (Abnormal) Collected: 11/10/20 1100    Specimen: Blood Updated: 11/10/20 1207     Procalcitonin 0.53 ng/mL     Narrative:      Results may be falsely decreased if patient taking Biotin.     Comprehensive Metabolic Panel [[id number]]  (Abnormal) Collected: 11/10/20 1143    Specimen: Blood Updated: 11/10/20 1202     Glucose 153 mg/dL      BUN 53 mg/dL      Creatinine 1.64 mg/dL      Sodium 135 mmol/L       Potassium 4.5 mmol/L      Chloride 101 mmol/L      CO2 21.8 mmol/L      Calcium 8.5 mg/dL      Total Protein 5.8 g/dL      Albumin 3.00 g/dL      ALT (SGPT) 165 U/L      AST (SGOT) 110 U/L      Alkaline Phosphatase 120 U/L      Total Bilirubin 0.2 mg/dL      eGFR Non African Amer 41 mL/min/1.73      Globulin 2.8 gm/dL      A/G Ratio 1.1 g/dL      BUN/Creatinine Ratio 32.3     Anion Gap 12.2 mmol/L     Narrative:      GFR Normal >60  Chronic Kidney Disease <60  Kidney Failure <15      D-dimer, Quantitative [[id number]]  (Abnormal) Collected: 11/10/20 1100    Specimen: Blood Updated: 11/10/20 1159     D-Dimer, Quantitative 3.82 MCGFEU/mL     Narrative:      Can be elevated in, but is not diagnostic for deep vein thrombosis (DVT) or pulmonary embolis (PE).  It is also elevated in other medical conditions.  Clinical correlation is required.  The negative cut-off value for the D-Dimer is 0.50 mcg FEU/mL for DVT and PE.      Manual Differential [[id number]]  (Abnormal) Collected: 11/10/20 1100    Specimen: Blood Updated: 11/10/20 1157     Neutrophil % 83.0 %      Lymphocyte % 8.0 %      Monocyte % 2.0 %      Bands %  0.0 %      Metamyelocyte % 0.0 %      Myelocyte % 0.0 %      Promyelocyte % 0.0 %      Atypical Lymphocyte % 7.0 %      Blasts % 0.0 %      Plasma Cells % 0.0 %      Prolymphocyte % 0.0 %      Other Cells % 0.0 %      Neutrophils Absolute 5.79 10*3/mm3      Lymphocytes Absolute 0.56 10*3/mm3      Monocytes Absolute 0.14 10*3/mm3      RBC Morphology Normal     WBC Morphology Normal     Platelet Morphology Normal    BNP [[id number]]  (Normal) Collected: 11/10/20 1100    Specimen: Blood Updated: 11/10/20 1154     proBNP 470.0 pg/mL     Narrative:      Among patients with dyspnea, NT-proBNP is highly sensitive for the detection of acute congestive heart failure. In addition NT-proBNP of <300 pg/ml effectively rules out acute congestive heart failure with 99% negative predictive value.    Results may be falsely  decreased if patient taking Biotin.      Blood Culture - Blood, Hand, Left [[id number]] Collected: 11/10/20 1100    Specimen: Blood from Hand, Left Updated: 11/10/20 1151    Lactic Acid, Plasma [[id number]]  (Abnormal) Collected: 11/10/20 1100    Specimen: Blood Updated: 11/10/20 1145     Lactate 3.5 mmol/L     COVID-19,[facility] IN-HOUSE,Nasal Swab No Transport Media 3-4 HR TAT - Swab, Nasal Cavity [[id number]]  (Normal) Collected: 11/10/20 1100    Specimen: Swab from Nasal Cavity Updated: 11/10/20 1140     COVID19 Not Detected    Narrative:      Fact sheet for providers: https://www.fda.gov/media/582526/download     Fact sheet for patients: https://www.fda.gov/media/032520/download    Influenza Antigen, Rapid - Swab, Nasopharynx [[id number]]  (Normal) Collected: 11/10/20 1100    Specimen: Swab from Nasopharynx Updated: 11/10/20 1127     Influenza A Ag, EIA Negative     Influenza B Ag, EIA Negative    CBC & Differential [[id number]]  (Abnormal) Collected: 11/10/20 1100    Specimen: Blood Updated: 11/10/20 1114    Narrative:      The following orders were created for panel order CBC & Differential.  Procedure                               Abnormality         Status                     ---------                               -----------         ------                     CBC Auto Differential[[id number]]        Abnormal            Final result                 Please view results for these tests on the individual orders.    CBC Auto Differential [[id number]]  (Abnormal) Collected: 11/10/20 1100    Specimen: Blood Updated: 11/10/20 1114     WBC 6.98 10*3/mm3      RBC 3.55 10*6/mm3      Hemoglobin 10.7 g/dL      Hematocrit 33.7 %      MCV 94.9 fL      MCH 30.1 pg      MCHC 31.8 g/dL      RDW 13.6 %      RDW-SD 47.9 fl      MPV 12.0 fL      Platelets 128 10*3/mm3      Neutrophil % 79.2 %      Lymphocyte % 12.9 %      Monocyte % 5.0 %      Eosinophil % 2.0 %      Basophil % 0.3 %      Immature Grans % 0.6 %      Neutrophils,  Absolute 5.53 10*3/mm3      Lymphocytes, Absolute 0.90 10*3/mm3      Monocytes, Absolute 0.35 10*3/mm3      Eosinophils, Absolute 0.14 10*3/mm3      Basophils, Absolute 0.02 10*3/mm3      Immature Grans, Absolute 0.04 10*3/mm3      nRBC 0.0 /100 WBC           Imaging Results (Most Recent)     Procedure Component Value Units Date/Time    CT Angiogram Chest [[id number]] Collected: 11/10/20 1309     Updated: 11/10/20 1312    Narrative:      CTA Chest    INDICATION:   Shortness of breath, elevated d-dimer    TECHNIQUE:   CT angiogram of the chest with IV contrast. 3-D reconstructions were obtained and reviewed.   Radiation dose reduction techniques included automated exposure control or exposure modulation based on body size. Count of known CT and cardiac nuc med studies  performed in previous 12 months: 0.     COMPARISON:   CTA of the chest from 8/9/2017    FINDINGS:   Filling defect is seen in several branches of the left lower lobar pulmonary artery. There is atelectasis at the left lung base and the lungs appear emphysematous, but are otherwise clear. Again seen are prominent mediastinal and hilar nodes of uncertain  etiology that are unchanged from the prior CTA from 2017. No focal concerning consolidation. Surgical clips are seen in the upper abdomen. The bones are ankylosed and there is stable mild compression deformity seen involving the superior endplate of T3.    If positive, report RV/LV ratio if >.0.9 RV/LV ratio is 1.0      Impression:      1. Pulmonary embolism is seen in the left lower lobar pulmonary artery. RV/LV ratio is 1.0    2. No other acute findings in the chest.    NOTIFICATION: Critical Value/emergent results were called by telephone at the time of interpretation on 11/10/2020 1:02 PM to Dr. Dhruv MD who verbally acknowledged these results.    Signer Name: Julianna Ritter MD   Signed: 11/10/2020 1:09 PM   Workstation Name: [id number]    [facility] Hip With or Without  Pelvis 2 - 3 View Right [[id number]] Collected: 11/10/20 1147     Updated: 11/10/20 1150    Narrative:      XR HIP W OR WO PELVIS 2-3 VIEW RIGHT-: 11/10/2020 11:23 AM     INDICATION:   Right hip pain for 2 weeks .     COMPARISON:   None available.     FINDINGS:  AP and frog-leg lateral view(s) of the right hip.  No fracture or  dislocation. No bone erosion or destruction.         Impression:      The study is slightly limited by the patient's size. No fracture or  dislocation is visible..     This report was finalized on 11/10/2020 11:48 AM by Dr. Cedric Bill MD.       XR Chest 1 View [[id number]] Collected: 11/10/20 1143     Updated: 11/10/20 1146    Narrative:      AP PORTABLE CHEST, 11/10/2020     HISTORY:  Shortness of air for few days     COMPARISON:  NONE     TECHNIQUE:  AP portable chest x-ray.     FINDINGS:  Heart size and pulmonary vascularity are normal. The lungs are expanded  and clear. No visible pulmonary infiltrate or pleural effusion.           Impression:      Negative single view chest     This report was finalized on 11/10/2020 11:43 AM by Dr. Cedric Bill MD.           not reviewed    ECG/EMG Results (most recent)     Procedure Component Value Units Date/Time    ECG 12 Lead [[id number]] Collected: 11/10/20 1125     Updated: 11/10/20 1134     QT Interval 324 ms     Narrative:      HEART RATE= 107  bpm  RR Interval= 560  ms  PA Interval= 162  ms  P Horizontal Axis= -9  deg  P Front Axis= 43  deg  QRSD Interval= 87  ms  QT Interval= 324  ms  QRS Axis= -1  deg  T Wave Axis= 30  deg  - OTHERWISE NORMAL ECG -  Sinus tachycardia  Electronically Signed By:   Date and Time of Study: 2020-11-10 11:25:44        not reviewed    Assessment/Plan     Pulmonary Embolism   Lovenox   SOA   Weak   Poor Appetite   Fever and chills    Cellulitis RLE   Staph Aureus   Rocephin and vanc   Culture in office Staph aureus    Rt hip pain for 4 weeks   Hip xray ok   Comes and goes but frequency has increased    Diarrhea   Black  stool for 1 week    Fever   Since Thursday night    Lactic acidosis   3.5 on admission    Few hours later 2.9    Ferritin elevation and procal elevation   963 and 0.53    Anemia/ 10.7/ micro/ micro   Acute [place] with recent drop    INR elevated/ 1.26    LFT elevation/ 165/ 110    SANTO   Dehydration   1.64/ 53/ GFR 41 today   1.02/ 21/GFR 72 on 11.4    Morbid obesity/ Body mass index is 49.04 kg/m².        Essential hypertension  Depression  COPD  Colon polyps              I discussed the patients findings and my recommendations with patient and his wife/ reviewed new information with his wife present.  Will continue isolation for the staph aureus. No sensitivities back yet.  Consult GI, Pulmonary, Nephrology.  Moving patient to ICU    Georgia Lucas DO  11/10/20  16:10 EST    Time: 60 min ICU time/  [id number]

## 2020-11-10 NOTE — ED NOTES
"Pt was heard shouting \"help\", said he couldn't find his call light which was on bedrail.  He said he needed to urinate and was too weak to pull his pants down.  Full assist for pt to urinate in urinal.  \"I'm weak and I'm in pain\".  Asked pt about his pain, since he said he was not in pain earlier, and he would not answer.  Collected pt's belongings in belonging bag including red colored shirt, black colored shorts, underpants, black croc-style shoes and glasses.  Made sure pt could reach and identify call light, to which pt said \"don't get smart with me, girl.  I called for help\".  This RN replied  \"yes sir that is why I came\".  \"Finally\", he said.  \"Now lay my head down\".          Kassandra Perez, RN  11/10/20 1415       Kassandra Perez, RN  11/10/20 1420    "

## 2020-11-10 NOTE — ED NOTES
Provided urinal for pt to give specimen. He asked for water, MD said it was ok so provided.  Dialed phone for pt to talk to his wife.     Kassandra Perez RN  11/10/20 2500

## 2020-11-10 NOTE — ED NOTES
Pt stated he has COPD and chronic fatigue, h/o smoking cigars daily, daily drinker.  Pt stated today is worse, though he saw Dr Lion for same s/s last Wednesday and received meds      Kassandra Perez RN  11/10/20 8614

## 2020-11-10 NOTE — TELEPHONE ENCOUNTER
MS. WEI SAYS THAT MR. WEI HAD HIS COVID TEST DONE ON 11/06/2020, HE RECEIVED A NEGATIVE RESULT 11/9/2020.     HE IS STILL HAVING LABORED BREATHING(COPD), LAST FEVER 11/9/2020,(101.4), NOT MUCH COUGH. HE HAS AN APPOINTMENT SCHEDULED 11/12/2020 WITH DR. MCGREGOR. SHE IS WANTING A CALL BACK TODAY, SHE SAYS THAT SHE WAS NEEDING A CALL BACK TODAY.   PLEASE ADVISE  Nancy Wei () [phone]

## 2020-11-10 NOTE — NURSING NOTE
CWON consult received. Patient with an unroofed blister to right lateral leg. He states this happened almost 2 weeks ago and it will not heal. Of note, patient with a hx of chronic lymphedema. Skin to BLE is thickened, dry and with areas of lichenification noted, all consistent with lymphedema. Right lateral leg wound is moist but not weepy at the time of assessment. There is mild extending erythema that was marked, suggesting cellulitis. Will recommend Silvasorb gel to wound and ace wrap compression, toes to knee, to help manage the swelling. He is agreeable to plan. Will also recommend Aquaphor ointment to aid in moisturizing. Wound care performed and patient tolerated well. Thank you for consult and please call with any questions.

## 2020-11-10 NOTE — PROGRESS NOTES
Pharmacy dosing Vancomycin consult  75yom, ht-165.1 cm, wt-134kg     Labs 11/10/20   Scr-1.64, CrCl-48.3, WBC-6.98, Platelets-128    Give initial dose of 2000mg ( 15mg/kg)   X 1 dose this evening.  .  Random level 1700hrs, 11/11/20.  Dose to be determined at that time.  Will continue to follow daily while on Vancomycin.

## 2020-11-10 NOTE — PROGRESS NOTES
Pharmacy dosing Lovenox consult  75yom, wt -134kg  Labs 11/10/20   Scr-1.64, CrCl-48.3, Platelets-128    Diagnosis- DVT/PE ( active thrombosis)    Received 140mg dose in ER 1400hrs today    Based on BMI(49.04) will dose 0.8mg/kg q12h.  Start on Lovenox 110mg q12h 0200hrs, 11/11/20.    Will continue to follow daily while on Lovenox.

## 2020-11-10 NOTE — ED NOTES
Asked pt again to urinate for specimen now that he has had some IV fluid     Kassandra Perez, RN  11/10/20 7133

## 2020-11-10 NOTE — ED PROVIDER NOTES
Subjective     Fever  Severity:  Moderate  Onset quality:  Sudden  Duration:  1 day  Timing:  Intermittent  Progression:  Waxing and waning  Chronicity:  New  Relieved by:  Nothing  Worsened by:  Nothing  Ineffective treatments:  None tried  Associated symptoms: diarrhea    Associated symptoms: no chest pain, no cough and no vomiting    Associated symptoms comment:  Rt leg rash  soa      Review of Systems   Constitutional: Positive for fever.   Respiratory: Negative for cough.    Cardiovascular: Negative for chest pain.   Gastrointestinal: Positive for diarrhea. Negative for vomiting.   All other systems reviewed and are negative.      Past Medical History:   Diagnosis Date   • Abnormal thyroid blood test 10/2016   • Anal fissure 03/2017   • Anal fissure    • Chronic fatigue    • Colon polyps    • COPD (chronic obstructive pulmonary disease) (CMS/HCC)    • Depression    • Hemorrhoids 02/23/2017    INT/EXT   • Hypertension    • Proctalgia    • Snoring    • SOB (shortness of breath)        No Known Allergies    Past Surgical History:   Procedure Laterality Date   • ANAL SPHINCTEROTOMY N/A 05/18/2017    CHEMICAL WITH BOTOX, DR. GEMA MALLOY AT [facility]   • COLONOSCOPY N/A 5/18/2017    5 MM SESSILE TUBULAR ADENOMA POLYP IN TRANSVERSE, 6 MM SESSILE HYPERPLASTIC POLYP IN DESCENDING, ANAL FISSURE, RESCOPE IN 5 YRS, DR. GMEA MALLOY AT [facility]   • HEMORRHOIDECTOMY N/A 8/30/2018    Procedure: INTERNAL/ EXTERNAL HEMORRHOIDECTOMY X2,  INTERNAL HEMORRHOID LIGATION X1, ANAL TAG EXCISION;  Surgeon: Gema Malloy MD;  Location: [facility];  Service: General   • LAPAROSCOPIC CHOLECYSTECTOMY N/A 1990s   • TONSILLECTOMY         Family History   Problem Relation Age of Onset   • Diabetes Father    • Stomach cancer Father    • Alcohol abuse Father    • Throat cancer Brother    • Alcohol abuse Brother    • Heart disease Mother    • No Known Problems Sister    • Malig Hyperthermia Neg Hx        Social History     Socioeconomic History   •  Marital status:      Spouse name: Not on file   • Number of children: Not on file   • Years of education: Not on file   • Highest education level: Not on file   Tobacco Use   • Smoking status: Former Smoker     Types: Cigars     Quit date:      Years since quittin.8   • Smokeless tobacco: Never Used   Substance and Sexual Activity   • Alcohol use: Yes     Alcohol/week: 28.0 standard drinks     Types: 28 Shots of liquor per week     Comment: DAILY   • Drug use: No   • Sexual activity: Defer           Objective   Physical Exam  Vitals signs and nursing note reviewed.   Constitutional:       General: He is not in acute distress.     Appearance: He is well-developed. He is obese. He is not ill-appearing, toxic-appearing or diaphoretic.   HENT:      Head: Normocephalic and atraumatic.      Mouth/Throat:      Pharynx: No pharyngeal swelling or oropharyngeal exudate.   Neck:      Musculoskeletal: Normal range of motion.   Cardiovascular:      Rate and Rhythm: Normal rate and regular rhythm.      Pulses: No decreased pulses.   Pulmonary:      Effort: Pulmonary effort is normal.      Breath sounds: Normal breath sounds. No decreased breath sounds, wheezing or rhonchi.   Abdominal:      Palpations: Abdomen is soft.      Tenderness: There is no guarding or rebound.   Musculoskeletal: Normal range of motion.      Right lower leg: Edema present.      Left lower leg: Edema present.      Comments: Rt lateral leg patychy erythema with blistering  Rt hip pain with rom   Lymphadenopathy:      Cervical: No cervical adenopathy.   Skin:     General: Skin is warm.      Findings: Erythema and rash present.   Neurological:      General: No focal deficit present.      Mental Status: He is alert and oriented to person, place, and time.      Cranial Nerves: No cranial nerve deficit.      Motor: No weakness.   Psychiatric:         Mood and Affect: Mood normal.         Behavior: Behavior normal.         Procedures           ED  Course  ED Course as of Nov 10 1314   Tue Nov 10, 2020   1129 Ekg by me sinus 107, qrs nml, no st elev    [BC]      ED Course User Index  [BC] Macho Bartlett MD              reeval, stable exam for admit                             MDM    Final diagnoses:   PE (pulmonary thromboembolism) (CMS/HCC)   Cellulitis of right leg            Macho Bartlett MD  11/10/20 4516

## 2020-11-10 NOTE — ED NOTES
Called lab to let them know about new blood culture orders, specimens are already in lab     Kassandra Perez RN  11/10/20 4142

## 2020-11-11 VITALS
WEIGHT: 294.7 LBS | HEART RATE: 120 BPM | SYSTOLIC BLOOD PRESSURE: 139 MMHG | OXYGEN SATURATION: 95 % | TEMPERATURE: 99.8 F | DIASTOLIC BLOOD PRESSURE: 75 MMHG | BODY MASS INDEX: 49.1 KG/M2 | RESPIRATION RATE: 20 BRPM | HEIGHT: 65 IN

## 2020-11-11 LAB — CREAT UR-MCNC: 56.3 MG/DL

## 2020-11-11 NOTE — SIGNIFICANT NOTE
11/10/20 2030   Provider Notification   Reason for Communication Change in status  (pt vomiting, )   Provider Name Dr. Lucas  (on floor, in pt room, visualized pt VS T, HR, o2)   Notification Route In person, on unit   Response See orders  (reports pt has been having fevers and chills, expected react)     Day shift charge nurse, pt's primary nurse for the day in room, night shift nurse, Dr. Lucas, and night shift cna present. Pt c/o chills. T checked, 99.8. showed to Dr. Lucas. Continuous pulse ox in room shows o2 sats 95% on room air, and HR in the 120's. Dr. Lucas reports the pt has been having fevers and chills today, she expects this with his condition.   Dr. Lucas reports she is well aware of pts condition and that she has made the other physicians well aware of his condition as well. She states she's reported off to the night shift Hospitalist here and called report to the [facility] doctors.   The pt's wife at bedside voiced reassurance at this statement.     Dr. Lucas provided an order for zofran for pt nausea.     Ice packs for pt comfort.

## 2020-11-11 NOTE — ED TRIAGE NOTES
Pt ICU transfer from [facility] via EMS. Per EMS pt dx with bilateral pulmonary embolisms, and GI bleed. Per EMS pt coded en route and was given epi en route.   Upon arrival to ED pt in cardiac arrest with dark black emesis coming from mouth.     All staff wearing appropriate PPE.

## 2020-11-11 NOTE — NURSING NOTE
Case Management Discharge Note      Final Note: Transfer to [facility]         Selected Continued Care - Discharged on 11/10/2020 Admission date: 11/10/2020 - Discharge disposition: Short Term [facility] ([facility])    Destination    No services have been selected for the patient.              Durable Medical Equipment    No services have been selected for the patient.              Dialysis/Infusion    No services have been selected for the patient.              Home Medical Care    No services have been selected for the patient.              Therapy    No services have been selected for the patient.              Community Resources    No services have been selected for the patient.                       Final Discharge Disposition Code: 02 - [facility] for [facility]

## 2020-11-11 NOTE — DISCHARGE SUMMARY
Kimberly Wei  1945  [id number]    Hospitalists Discharge Summary    Date of Admission: 11/10/2020  Date of Discharge:  11/10/2020    History of Present Illness and Hospital Course Summary:       Patient has had fever for over a week and diarrhea for many days. His hgb has acutely dropped and he says he has been having diarrhea with black stool.  He is acutelly SOA and weak and loss of appetite.  He did not offer this information to the ER physician before admission.  He has not been drinking enough water per his wife and not eating well.  While I am in the room getting history he is chilling.  I ordered another blood gas. He has had right hip pain for 4 weeks (xray is negative). He has cellulitis in his RLE.  He was on sulfa drug.  Other than the sulfa he has not been on any other antibiotics for the past year.  He has ABLA and SANTO and staph areus from RLE wound, and Pulmonary embolism.  I put patient in the ICU for close monitoring.  However, GI made rounds and recommended that we transfer the patient to [facility].  I have called the intensivist and they are arranging for a bed at [facility] in the ICU.  He will more than likely need a filter.  He has PE and GI Bleed with ABLA    Primary Discharge Diagnoses and Secondary Discharge Diagnoses:        Pulmonary Embolism              Lovenox              SOA              Weak              Poor Appetite              Fever and chills   To [facility] ICU for potential need for Inferior vena cava umbrella     Cellulitis RLE              Staph Aureus              Rocephin and vanc              Culture in office Staph aureus     Rt hip pain for 4 weeks              Hip xray ok              Comes and goes but frequency has increased     Diarrhea              Black stool for 1 week     Fever              Since Thursday night     Lactic acidosis              3.5 on admission                       Few hours later  2.9     Ferritin elevation and procal elevation              963 and 0.53     Anemia/ 10.7/ micro/ micro              Acute [place]NA with recent drop     INR elevated/ 1.26     LFT elevation/ 165/ 110     SANTO              Dehydration              1.64/ 53/ GFR 41 today              1.02/ 21/GFR 72 on 11.4     Morbid obesity/ Body mass index is 49.04 kg/m².          PCP  Patient Care Team:  Gladis Longoria MD as PCP - General (Family Medicine)    Consults:   Consults     Date and Time Order Name Status Description    11/10/2020 1657 Inpatient Nephrology Consult      11/10/2020 1650 Inpatient Gastroenterology Consult      11/10/2020 1641 Inpatient Pulmonology Consult            Operations and Procedures Performed:       Xr Abdomen Flat & Upright    Result Date: 11/10/2020  Narrative: ABDOMEN, 10/10/2020  HISTORY:  75-year-old male admitted to the hospital today with shortness of breath. Pulmonary embolism on CTA chest. Abdominal distention and acute GI bleed are noted.  TECHNIQUE: Flat and upright abdomen series obtained portably, significantly limited by patient body habitus and the restrictions of AP portable radiography.  FINDINGS: Air is scattered throughout small bowel and colon. The nondependent cecum is mildly distended. There is no convincing evidence of mechanical bowel obstruction. There is no free intraperitoneal air beneath the diaphragm.     Impression: 1.  Bowel gas pattern is nonspecific, but there is no convincing evidence of bowel obstruction, and there is no visible free air beneath the diaphragm. 2.  CT abdomen/pelvis examination is reportedly pending. Signer Name: Francisco Javier Kearney MD  Signed: 11/10/2020 6:28 PM  Workstation Name: [id number]  [facility]    Xr Chest 1 View    Result Date: 11/10/2020  Narrative: AP PORTABLE CHEST, 11/10/2020  HISTORY: Shortness of air for few days  COMPARISON: NONE  TECHNIQUE: AP portable chest x-ray.  FINDINGS: Heart size and pulmonary  vascularity are normal. The lungs are expanded and clear. No visible pulmonary infiltrate or pleural effusion.       Impression: Negative single view chest  This report was finalized on 11/10/2020 11:43 AM by Dr. Cedric Bill MD.      Ct Angiogram Chest    Result Date: 11/10/2020  Narrative: CTA Chest INDICATION: Shortness of breath, elevated d-dimer TECHNIQUE: CT angiogram of the chest with IV contrast. 3-D reconstructions were obtained and reviewed.   Radiation dose reduction techniques included automated exposure control or exposure modulation based on body size. Count of known CT and cardiac nuc med studies performed in previous 12 months: 0. COMPARISON: CTA of the chest from 8/9/2017 FINDINGS: Filling defect is seen in several branches of the left lower lobar pulmonary artery. There is atelectasis at the left lung base and the lungs appear emphysematous, but are otherwise clear. Again seen are prominent mediastinal and hilar nodes of uncertain etiology that are unchanged from the prior CTA from 2017. No focal concerning consolidation. Surgical clips are seen in the upper abdomen. The bones are ankylosed and there is stable mild compression deformity seen involving the superior endplate of T3. If positive, report RV/LV ratio if >.0.9 RV/LV ratio is 1.0     Impression: 1. Pulmonary embolism is seen in the left lower lobar pulmonary artery. RV/LV ratio is 1.0 2. No other acute findings in the chest. NOTIFICATION: Critical Value/emergent results were called by telephone at the time of interpretation on 11/10/2020 1:02 PM to Dr. Dhruv MD who verbally acknowledged these results. Signer Name: Julianna Ritter MD  Signed: 11/10/2020 1:09 PM  Workstation Name: [id number]  [facility]    Xr Hip With Or Without Pelvis 2 - 3 View Right    Result Date: 11/10/2020  Narrative: XR HIP W OR WO PELVIS 2-3 VIEW RIGHT-: 11/10/2020 11:23 AM  INDICATION: Right hip pain for 2 weeks .  COMPARISON: None available.   FINDINGS: AP and frog-leg lateral view(s) of the right hip.  No fracture or dislocation. No bone erosion or destruction.       Impression: The study is slightly limited by the patient's size. No fracture or dislocation is visible..  This report was finalized on 11/10/2020 11:48 AM by Dr. Cedric Bill MD.        Allergies:  has No Known Allergies.    Juan Luis  reviewed    Discharge Medications:     Discharge Medications      ASK your doctor about these medications      Instructions Start Date   furosemide 20 MG tablet  Commonly known as: LASIX   40 mg, Oral, Daily      ibuprofen 200 MG tablet  Commonly known as: ADVIL,MOTRIN   200 mg, Oral, Every 6 Hours PRN      lisinopril 20 MG tablet  Commonly known as: PRINIVIL,ZESTRIL   20 mg, Oral, Daily      potassium chloride 10 MEQ CR tablet   20 mEq, Oral, Daily      Ventolin  (90 Base) MCG/ACT inhaler  Generic drug: albuterol sulfate HFA   1 puff, Inhalation, As Needed             Last Lab Results:   Lab Results (most recent)     Procedure Component Value Units Date/Time    Sodium, Urine, Random - Urine, Clean Catch [[id number]] Collected: 11/10/20 1356    Specimen: Urine, Clean Catch Updated: 11/10/20 1914     Sodium, Urine 66 mmol/L     Narrative:      Reference intervals for random urine have not been established.  Clinical usage is dependent upon physician's interpretation in combination with other laboratory tests.       Creatinine, Urine, Random - Urine, Clean Catch [[id number]] Collected: 11/10/20 1356    Specimen: Urine, Clean Catch Updated: 11/10/20 1903    Gastrointestinal Panel, PCR - Stool, Per Rectum [[id number]] Collected: 11/10/20 1751    Specimen: Stool from Per Rectum Updated: 11/10/20 1758    Basic Metabolic Panel [[id number]]  (Abnormal) Collected: 11/10/20 1541    Specimen: Blood Updated: 11/10/20 1753     Glucose 126 mg/dL      BUN 51 mg/dL      Creatinine 1.61 mg/dL      Sodium 135 mmol/L      Potassium 4.7 mmol/L      Chloride 101 mmol/L      CO2 22.1  mmol/L      Calcium 8.2 mg/dL      eGFR Non African Amer 42 mL/min/1.73      BUN/Creatinine Ratio 31.7     Anion Gap 11.9 mmol/L     Narrative:      GFR Normal >60  Chronic Kidney Disease <60  Kidney Failure <15      Magnesium [[id number]]  (Abnormal) Collected: 11/10/20 1541    Specimen: Blood Updated: 11/10/20 1753     Magnesium 2.5 mg/dL     Phosphorus [[id number]]  (Normal) Collected: 11/10/20 1541    Specimen: Blood Updated: 11/10/20 1753     Phosphorus 3.7 mg/dL     Lactate Dehydrogenase [[id number]]  (Abnormal) Collected: 11/10/20 1541    Specimen: Blood Updated: 11/10/20 1602      U/L     Ferritin [[id number]]  (Abnormal) Collected: 11/10/20 1143    Specimen: Blood Updated: 11/10/20 1533     Ferritin 963.00 ng/mL     Narrative:      Results may be falsely decreased if patient taking Biotin.      Lactic Acid, Reflex [[id number]]  (Abnormal) Collected: 11/10/20 1502    Specimen: Blood Updated: 11/10/20 1518     Lactate 2.9 mmol/L     Lactic Acid, Reflex Timer (This will reflex a repeat order 3-3:15 hours after ordered.) [[id number]] Collected: 11/10/20 1100    Specimen: Blood Updated: 11/10/20 1445     Hold Tube Hold for add-ons.     Comment: Auto resulted.       Urinalysis With Culture If Indicated - Urine, Clean Catch [[id number]]  (Normal) Collected: 11/10/20 1356    Specimen: Urine, Clean Catch Updated: 11/10/20 1409     Color, UA Yellow     Appearance, UA Clear     pH, UA <=5.0     Specific Gravity, UA 1.015     Glucose, UA Negative     Ketones, UA Negative     Bilirubin, UA Negative     Blood, UA Negative     Protein, UA Negative     Leuk Esterase, UA Negative     Nitrite, UA Negative     Urobilinogen, UA 0.2 E.U./dL    Narrative:      Urine microscopic not indicated.    Troponin [[id number]]  (Normal) Collected: 11/10/20 1143    Specimen: Blood Updated: 11/10/20 1339     Troponin T <0.010 ng/mL     Narrative:      Troponin T Reference Range:  <= 0.03 ng/mL-   Negative for AMI  >0.03 ng/mL-     Abnormal  for myocardial necrosis.  Clinicians would have to utilize clinical acumen, EKG, Troponin and serial changes to determine if it is an Acute Myocardial Infarction or myocardial injury due to an underlying chronic condition.       Results may be falsely decreased if patient taking Biotin.      aPTT [[id number]]  (Normal) Collected: 11/10/20 1100    Specimen: Blood Updated: 11/10/20 1329     PTT 31.7 seconds     Narrative:      PTT = The equivalent PTT values for the therapeutic range of heparin levels at 0.1 to 0.7 U/ml are 53 to 110 seconds.      Protime-INR [[id number]]  (Abnormal) Collected: 11/10/20 1100    Specimen: Blood Updated: 11/10/20 1328     Protime 15.4 Seconds      INR 1.26    Narrative:      Therapeutic Ranges for INR: 2.0-3.0 (PT 20-30)                              2.5-3.5 (PT 25-34)    Blood Culture - Blood, Arm, Left [[id number]] Collected: 11/10/20 1228    Specimen: Blood from Arm, Left Updated: 11/10/20 1231    Procalcitonin [[id number]]  (Abnormal) Collected: 11/10/20 1100    Specimen: Blood Updated: 11/10/20 1207     Procalcitonin 0.53 ng/mL     Narrative:      Results may be falsely decreased if patient taking Biotin.     Comprehensive Metabolic Panel [[id number]]  (Abnormal) Collected: 11/10/20 1143    Specimen: Blood Updated: 11/10/20 1202     Glucose 153 mg/dL      BUN 53 mg/dL      Creatinine 1.64 mg/dL      Sodium 135 mmol/L      Potassium 4.5 mmol/L      Chloride 101 mmol/L      CO2 21.8 mmol/L      Calcium 8.5 mg/dL      Total Protein 5.8 g/dL      Albumin 3.00 g/dL      ALT (SGPT) 165 U/L      AST (SGOT) 110 U/L      Alkaline Phosphatase 120 U/L      Total Bilirubin 0.2 mg/dL      eGFR Non African Amer 41 mL/min/1.73      Globulin 2.8 gm/dL      A/G Ratio 1.1 g/dL      BUN/Creatinine Ratio 32.3     Anion Gap 12.2 mmol/L     Narrative:      GFR Normal >60  Chronic Kidney Disease <60  Kidney Failure <15      D-dimer, Quantitative [[id number]]  (Abnormal) Collected: 11/10/20 1100    Specimen:  Blood Updated: 11/10/20 1159     D-Dimer, Quantitative 3.82 MCGFEU/mL     Narrative:      Can be elevated in, but is not diagnostic for deep vein thrombosis (DVT) or pulmonary embolis (PE).  It is also elevated in other medical conditions.  Clinical correlation is required.  The negative cut-off value for the D-Dimer is 0.50 mcg FEU/mL for DVT and PE.      Manual Differential [[id number]]  (Abnormal) Collected: 11/10/20 1100    Specimen: Blood Updated: 11/10/20 1157     Neutrophil % 83.0 %      Lymphocyte % 8.0 %      Monocyte % 2.0 %      Bands %  0.0 %      Metamyelocyte % 0.0 %      Myelocyte % 0.0 %      Promyelocyte % 0.0 %      Atypical Lymphocyte % 7.0 %      Blasts % 0.0 %      Plasma Cells % 0.0 %      Prolymphocyte % 0.0 %      Other Cells % 0.0 %      Neutrophils Absolute 5.79 10*3/mm3      Lymphocytes Absolute 0.56 10*3/mm3      Monocytes Absolute 0.14 10*3/mm3      RBC Morphology Normal     WBC Morphology Normal     Platelet Morphology Normal    BNP [[id number]]  (Normal) Collected: 11/10/20 1100    Specimen: Blood Updated: 11/10/20 1154     proBNP 470.0 pg/mL     Narrative:      Among patients with dyspnea, NT-proBNP is highly sensitive for the detection of acute congestive heart failure. In addition NT-proBNP of <300 pg/ml effectively rules out acute congestive heart failure with 99% negative predictive value.    Results may be falsely decreased if patient taking Biotin.      Blood Culture - Blood, Hand, Left [[id number]] Collected: 11/10/20 1100    Specimen: Blood from Hand, Left Updated: 11/10/20 1151    Lactic Acid, Plasma [[id number]]  (Abnormal) Collected: 11/10/20 1100    Specimen: Blood Updated: 11/10/20 1145     Lactate 3.5 mmol/L     COVID-19,[facility] IN-HOUSE,Nasal Swab No Transport Media 3-4 HR TAT - Swab, Nasal Cavity [[id number]]  (Normal) Collected: 11/10/20 1100    Specimen: Swab from Nasal Cavity Updated: 11/10/20 1140     COVID19 Not Detected    Narrative:      Fact sheet for providers:  https://www.fda.gov/media/742964/download     Fact sheet for patients: https://www.fda.gov/media/347974/download    Influenza Antigen, Rapid - Swab, Nasopharynx [[id number]]  (Normal) Collected: 11/10/20 1100    Specimen: Swab from Nasopharynx Updated: 11/10/20 1127     Influenza A Ag, EIA Negative     Influenza B Ag, EIA Negative    CBC & Differential [[id number]]  (Abnormal) Collected: 11/10/20 1100    Specimen: Blood Updated: 11/10/20 1114    Narrative:      The following orders were created for panel order CBC & Differential.  Procedure                               Abnormality         Status                     ---------                               -----------         ------                     CBC Auto Differential[[id number]]        Abnormal            Final result                 Please view results for these tests on the individual orders.    CBC Auto Differential [[id number]]  (Abnormal) Collected: 11/10/20 1100    Specimen: Blood Updated: 11/10/20 1114     WBC 6.98 10*3/mm3      RBC 3.55 10*6/mm3      Hemoglobin 10.7 g/dL      Hematocrit 33.7 %      MCV 94.9 fL      MCH 30.1 pg      MCHC 31.8 g/dL      RDW 13.6 %      RDW-SD 47.9 fl      MPV 12.0 fL      Platelets 128 10*3/mm3      Neutrophil % 79.2 %      Lymphocyte % 12.9 %      Monocyte % 5.0 %      Eosinophil % 2.0 %      Basophil % 0.3 %      Immature Grans % 0.6 %      Neutrophils, Absolute 5.53 10*3/mm3      Lymphocytes, Absolute 0.90 10*3/mm3      Monocytes, Absolute 0.35 10*3/mm3      Eosinophils, Absolute 0.14 10*3/mm3      Basophils, Absolute 0.02 10*3/mm3      Immature Grans, Absolute 0.04 10*3/mm3      nRBC 0.0 /100 WBC         Imaging Results (Most Recent)     Procedure Component Value Units Date/Time    CT Abdomen Pelvis Without Contrast [[id number]] Resulted: 11/10/20 1851     Updated: 11/10/20 1957    XR Abdomen Flat & Upright [[id number]] Collected: 11/10/20 1828     Updated: 11/10/20 1830    Narrative:      ABDOMEN, 10/10/2020      HISTORY:     75-year-old male admitted to the hospital today with shortness of breath. Pulmonary embolism on CTA chest. Abdominal distention and acute GI bleed are noted.      TECHNIQUE:  Flat and upright abdomen series obtained portably, significantly limited by patient body habitus and the restrictions of AP portable radiography.      FINDINGS:  Air is scattered throughout small bowel and colon. The nondependent cecum is mildly distended. There is no convincing evidence of mechanical bowel obstruction. There is no free intraperitoneal air beneath the diaphragm.      Impression:      1.  Bowel gas pattern is nonspecific, but there is no convincing evidence of bowel obstruction, and there is no visible free air beneath the diaphragm.  2.  CT abdomen/pelvis examination is reportedly pending.    Signer Name: Francisco Javier Kearney MD   Signed: 11/10/2020 6:28 PM   Workstation Name: [id number]    [facility]    CT Angiogram Chest [[id number]] Collected: 11/10/20 1309     Updated: 11/10/20 1312    Narrative:      CTA Chest    INDICATION:   Shortness of breath, elevated d-dimer    TECHNIQUE:   CT angiogram of the chest with IV contrast. 3-D reconstructions were obtained and reviewed.   Radiation dose reduction techniques included automated exposure control or exposure modulation based on body size. Count of known CT and cardiac nuc med studies  performed in previous 12 months: 0.     COMPARISON:   CTA of the chest from 8/9/2017    FINDINGS:   Filling defect is seen in several branches of the left lower lobar pulmonary artery. There is atelectasis at the left lung base and the lungs appear emphysematous, but are otherwise clear. Again seen are prominent mediastinal and hilar nodes of uncertain  etiology that are unchanged from the prior CTA from 2017. No focal concerning consolidation. Surgical clips are seen in the upper abdomen. The bones are ankylosed and there is stable mild compression deformity seen  involving the superior endplate of T3.    If positive, report RV/LV ratio if >.0.9 RV/LV ratio is 1.0      Impression:      1. Pulmonary embolism is seen in the left lower lobar pulmonary artery. RV/LV ratio is 1.0    2. No other acute findings in the chest.    NOTIFICATION: Critical Value/emergent results were called by telephone at the time of interpretation on 11/10/2020 1:02 PM to Dr. Dhruv MD who verbally acknowledged these results.    Signer Name: Julianna Ritter MD   Signed: 11/10/2020 1:09 PM   Workstation Name: [id number]    [facility]    XR Hip With or Without Pelvis 2 - 3 View Right [[id number]] Collected: 11/10/20 1147     Updated: 11/10/20 1150    Narrative:      XR HIP W OR WO PELVIS 2-3 VIEW RIGHT-: 11/10/2020 11:23 AM     INDICATION:   Right hip pain for 2 weeks .     COMPARISON:   None available.     FINDINGS:  AP and frog-leg lateral view(s) of the right hip.  No fracture or  dislocation. No bone erosion or destruction.         Impression:      The study is slightly limited by the patient's size. No fracture or  dislocation is visible..     This report was finalized on 11/10/2020 11:48 AM by Dr. Cedric Bill MD.       XR Chest 1 View [[id number]] Collected: 11/10/20 1143     Updated: 11/10/20 1146    Narrative:      AP PORTABLE CHEST, 11/10/2020     HISTORY:  Shortness of air for few days     COMPARISON:  NONE     TECHNIQUE:  AP portable chest x-ray.     FINDINGS:  Heart size and pulmonary vascularity are normal. The lungs are expanded  and clear. No visible pulmonary infiltrate or pleural effusion.           Impression:      Negative single view chest     This report was finalized on 11/10/2020 11:43 AM by Dr. Cedric Bill MD.             PROCEDURES      Condition on Discharge:  Stable but critical    Physical Exam at Discharge  Vital Signs  Temp:  [97.5 °F (36.4 °C)-98.3 °F (36.8 °C)] 97.5 °F (36.4 °C)  Heart Rate:  [] 120  Resp:  [18-20] 20  BP: ()/(58-75)  139/75    Body mass index is 49.04 kg/m².      Physical Exam  Vitals signs and nursing note reviewed.   Constitutional:       Comments: Patient is morbidly obese.   Cardiovascular:      Comments: Tachycardia and regular rhythm  Pulmonary:      Comments: Tachypnea/ 20 bpm but clear  Very SOA  Abdominal:      Comments: Very distended and tympanitic.    He was sent to radiology for a CT scan of abdomen   Skin:     General: Skin is warm and dry.   Neurological:      Mental Status: He is oriented to person, place, and time.      Comments: Very pleasant man and wife.  Very informative.   Psychiatric:         Mood and Affect: Mood normal.         Behavior: Behavior normal.         Thought Content: Thought content normal.         Judgment: Judgment normal.           Discharge Disposition  [facility] ICU    Visiting Nurse:    No     Home PT/OT:  No     Home Safety Evaluation:  No     DME  None    Discharge Diet:      Dietary Orders (From admission, onward)     Start     Ordered    11/10/20 1719  NPO Diet  Diet Effective Now      11/10/20 1723                Activity at Discharge:  Bedrest in ICU    Pre-discharge education  Long discussions with  and wife about the need to treat and the risks and seriousness of these diagnoses.  They understand the need for transfer.      Follow-up Appointments  Future Appointments   Date Time Provider Department Center   11/12/2020  2:30 PM Gladis Longoria MD [facility]         Test Results Pending at Discharge  Pending Labs     Order Current Status    Blood Culture - Blood, Arm, Left In process    Blood Culture - Blood, Hand, Left In process    Creatinine, Urine, Random - Urine, Clean Catch In process    Gastrointestinal Panel, PCR - Stool, Per Rectum In process         Results of the CT of the abdomen pending    Georgia Lucas DO  11/10/20  19:57 EST    Time: 30 min (if over 30 minutes give explanation as to why it took greater than 30 minutes)

## 2020-11-11 NOTE — SIGNIFICANT NOTE
11/10/20 2101   Family Notification   Reason for Communication pt transferring to [facility]  (EMT in room to take pt, wife at bedside will follow them)   Family Member's Name wife at bedside, talking to EMT, day shift charge RN,    Family Member's Relationship to Patient Spouse   Notification Route In person, on unit  (wife in room, at bedside, talking to EMT)

## 2020-11-11 NOTE — ED PROVIDER NOTES
EMERGENCY DEPARTMENT ENCOUNTER  Patient was placed in face mask in first look and the following protective measures were taken unless additional measures were taken and documented below in the ED course. Patient was wearing facemask when I entered the room and throughout our encounter. I wore full protective equipment throughout this patient encounter including a face mask, and gloves. Hand hygiene was performed before donning protective equipment and after removal when leaving the room.    Room Number:  15/15  Date of encounter:  11/10/2020  PCP: Gladis Longoria MD    HPI:  Context: Kimberly Wei is a 75 y.o. male who presents to the ED c/o chief complaint of cardiac arrest.  Patient was a transfer from outside hospital, seen there and diagnosed with pulmonary embolism, subsequently given Lovenox, found to have a GI bleed, was being transferred to the ICU when he underwent cardiac arrest while in transport.  History limited as patient is in cardiac arrest.  Per EMS, patient was ill-appearing when they arrived, speaking, was tachycardic and in distress.  Patient during transport became bradycardic and then had witnessed arrest.  Patient was actively being bagged, CPR ongoing, having copious coffee-ground emesis.  IV access was present in the right antecubital fossa.  Patient had asystole throughout EMS CPR, no shocks delivered, received 3 doses of epinephrine, no other medications.    MEDICAL HISTORY REVIEW  Reviewed in EPIC    PAST MEDICAL HISTORY  Active Ambulatory Problems     Diagnosis Date Noted   • No Active Ambulatory Problems     Resolved Ambulatory Problems     Diagnosis Date Noted   • No Resolved Ambulatory Problems     No Additional Past Medical History       PAST SURGICAL HISTORY  No past surgical history on file.    FAMILY HISTORY  No family history on file.    SOCIAL HISTORY  Social History     Socioeconomic History   • Marital status:      Spouse name: Not on file   • Number of children:  Not on file   • Years of education: Not on file   • Highest education level: Not on file       ALLERGIES  Patient has no allergy information on record.    The patient's allergies have been reviewed    REVIEW OF SYSTEMS  Unable to obtain review of systems secondary to cardiac arrest    PHYSICAL EXAM  I have reviewed the triage vital signs and nursing notes.  ED Triage Vitals   Temp Pulse Resp BP SpO2   -- -- -- -- --      Temp src Heart Rate Source Patient Position BP Location FiO2 (%)   -- -- -- -- --       General: Cardiac arrest, CPR ongoing  HENT: NCAT, pupils fixed and dilated, Nares patent, patient actively being bagged, copious coffee-ground emesis in mouth and on face chest and neck.  Eyes: no scleral icterus  Neck: trachea midline, no ROM limitations  CV: CPR ongoing, palpable pulses with CPR, no spontaneous pulses  Respiratory: Patient actively being bagged, bilateral breath sounds with bagging, no spontaneous respirations  Abdomen: soft, distended  : deferred  Musculoskeletal: no deformity  Neuro: GCS 3  Skin: Cool, dry    LAB RESULTS  No results found for this or any previous visit (from the past 24 hour(s)).    I ordered the above labs and reviewed the results.    RADIOLOGY  No Radiology Exams Resulted Within Past 24 Hours    I ordered the above noted radiological studies. I reviewed the images and results. I agree with the radiologist interpretation.    PROCEDURES  Procedures   Size 5 LMA was placed without difficulty, cuff was inflated, patient was actively being bagged, bilateral breath sounds with bagging.    MEDICATIONS GIVEN IN ER  Medications   EPINEPHrine (ADRENALIN) injection (1 mg Intravenous Given 11/10/20 2207)       PROGRESS, DATA ANALYSIS, CONSULTS, AND MEDICAL DECISION MAKING  A complete history and physical exam have been performed.  All available laboratory and imaging results have been reviewed by myself prior to disposition.    MDM  After the initial H&P, I discussed pertinent  information from history and physical exam with patient/family.  Discussed differential diagnosis.  Discussed plan for ED evaluation/work-up/treatment.  All questions answered.  Patient/family is agreeable with plan.  ED Course as of Nov 10 2218   Tue Nov 10, 2020   2155 Patient assessed immediately once he rolled into the emergency department.    [JG]    I wore full protective equipment throughout this patient encounter including a CAPR, gown and gloves. Hand hygiene was performed before donning protective equipment and after removal when leaving the room.        [JG]    Decision was made upon patient presentation with GI bleed with copious coffee-ground emesis, airway would be difficult to obtained, would likely compromise good CPR.  Decision was made to place an LMA so could continue with uninterrupted CPR with plan for intubation if ROSC was achieved.    [JG]    Patient has been undergoing CPR for greater than 20 minutes, known pulmonary embolism with GI bleed, status post Lovenox, no shockable rhythms throughout, asystole on monitor for serial pulse checks.  Decision was made to cease resuscitation, time of death called.    [JG]    Patient underwent multiple rounds of CPR here, initial rhythm was PEA, all subsequent rhythms were asystole.  Patient received multiple doses of epinephrine, no other ACLS medications.  See code summary for further details.    [JG]      ED Course User Index  [JG] Joey Damon MD       AS OF 22:18 EST VITALS:    BP -    HR -    TEMP -    O2 SATS -      DIAGNOSIS  Final diagnoses:   Cardiac arrest (CMS/HCC)   Gastrointestinal hemorrhage, unspecified gastrointestinal hemorrhage type         DISPOSITION       Joey Damon MD  11/10/20 [id number]

## 2020-11-11 NOTE — PLAN OF CARE
Goal Outcome Evaluation:  Plan of Care Reviewed With: patient, spouse  Progress: no change  Outcome Summary: Per shift report from charge RN, pt to transfer ASAP to [facility]. ICU as pt needs ICU care. Day shift charge RN who was primary nurse during day is staying over to provide, asist in pt care. Pt spouse at bedside, reports she will travel via car to [facility]. but would like to stay in pt room until EMT arrives to take pt. Day shift charge RN pt's primary day shift rn is filling out transfer paper work and states he will give report to [facility] as he has been in care of the pt throughout the day.

## 2020-11-11 NOTE — CONSULTS
was requested with the patient nurse. Patient's wife was present at the time of visit. The patient's physician and  were present. Emotional support was provided through empathic presence. Wife expressed thanks and  remains available.

## 2020-11-15 LAB
BACTERIA SPEC AEROBE CULT: NORMAL
BACTERIA SPEC AEROBE CULT: NORMAL

## 2020-11-16 LAB — BACTERIA SPEC AEROBE CULT: NORMAL

## 2020-11-23 ENCOUNTER — TELEPHONE (OUTPATIENT)
Dept: INTERNAL MEDICINE | Facility: CLINIC | Age: 75
End: 2020-11-23

## (undated) DEVICE — LOU MINOR PROCEDURE: Brand: MEDLINE INDUSTRIES, INC.

## (undated) DEVICE — SPNG LAP 18X18IN LF STRL PK/5

## (undated) DEVICE — GLV SURG BIOGEL LTX PF 7

## (undated) DEVICE — GLV SURG SENSICARE PI LF PF 7.5 GRN STRL

## (undated) DEVICE — STERILE LATEX POWDER-FREE SURGICAL GLOVESWITH NITRILE COATING: Brand: PROTEXIS

## (undated) DEVICE — SNAR POLYP SENSATION STDOVL 27 240 BX40

## (undated) DEVICE — ANTIBACTERIAL UNDYED BRAIDED (POLYGLACTIN 910), SYNTHETIC ABSORBABLE SUTURE: Brand: COATED VICRYL

## (undated) DEVICE — PREP SOL POVIDONE/IODINE BT 4OZ

## (undated) DEVICE — PANTY KNIT MATERN L/XL

## (undated) DEVICE — DRAPE,LITHOTOMY,ATTACHED,STERILE: Brand: MEDLINE

## (undated) DEVICE — NDL HYPO PRECISIONGLIDE REG 25G 1 1/2

## (undated) DEVICE — GOWN SURG AERO CHROME XL

## (undated) DEVICE — ENSEAL TRIO TEMPERATURE CONTOLLED TISSUE SEALING TECHNOLOGY DISPOSABLE TISSUE SEALING DEVICE TAPTRONIC TRIGGER ACTIVATED POWER 3MM CURVED JAW: Brand: ENSEAL

## (undated) DEVICE — THE TORRENT IRRIGATION SCOPE CONNECTOR IS USED WITH THE TORRENT IRRIGATION TUBING TO PROVIDE IRRIGATION FLUIDS SUCH AS STERILE WATER DURING GASTROINTESTINAL ENDOSCOPIC PROCEDURES WHEN USED IN CONJUNCTION WITH AN IRRIGATION PUMP (OR ELECTROSURGICAL UNIT).: Brand: TORRENT

## (undated) DEVICE — TUBING, SUCTION, 1/4" X 10', STRAIGHT: Brand: MEDLINE

## (undated) DEVICE — SPNG GZ WOVN 4X4IN 12PLY 10/BX STRL

## (undated) DEVICE — DRAPE,UTILITY,TAPE,15X26,STERILE: Brand: MEDLINE

## (undated) DEVICE — GLV SURG SENSICARE PI PF LF 7 GRN STRL

## (undated) DEVICE — CANNULA,ADULT,SOFT-TOUCH,7TUBE,SC: Brand: MEDLINE

## (undated) DEVICE — IRRIGATOR BULB ASEPTO 60CC STRL

## (undated) DEVICE — Device: Brand: DEFENDO AIR/WATER/SUCTION AND BIOPSY VALVE

## (undated) DEVICE — STRAP STIRUP SLP RNG 19X3.5IN DISP

## (undated) DEVICE — GOWN,NON-REINFORCED,SIRUS,SET IN SLV,XL: Brand: MEDLINE

## (undated) DEVICE — THE SINGLE USE ETRAP – POLYP TRAP IS USED FOR SUCTION RETRIEVAL OF ENDOSCOPICALLY REMOVED POLYPS.: Brand: ETRAP

## (undated) DEVICE — MSK O2 NONREBRTHR W/VNT LF ADULT 7FT